# Patient Record
Sex: MALE | Race: OTHER | Employment: OTHER | URBAN - METROPOLITAN AREA
[De-identification: names, ages, dates, MRNs, and addresses within clinical notes are randomized per-mention and may not be internally consistent; named-entity substitution may affect disease eponyms.]

---

## 2019-08-27 ENCOUNTER — APPOINTMENT (OUTPATIENT)
Dept: GENERAL RADIOLOGY | Age: 79
DRG: 439 | End: 2019-08-27
Attending: EMERGENCY MEDICINE
Payer: MEDICARE

## 2019-08-27 ENCOUNTER — HOSPITAL ENCOUNTER (INPATIENT)
Age: 79
LOS: 2 days | Discharge: HOME | DRG: 439 | End: 2019-08-29
Attending: EMERGENCY MEDICINE | Admitting: HOSPITALIST
Payer: MEDICARE

## 2019-08-27 ENCOUNTER — APPOINTMENT (OUTPATIENT)
Dept: CT IMAGING | Age: 79
DRG: 439 | End: 2019-08-27
Attending: EMERGENCY MEDICINE
Payer: MEDICARE

## 2019-08-27 DIAGNOSIS — K85.90 ACUTE PANCREATITIS, UNSPECIFIED COMPLICATION STATUS, UNSPECIFIED PANCREATITIS TYPE: Primary | ICD-10-CM

## 2019-08-27 DIAGNOSIS — C64.1 RENAL CELL CANCER, RIGHT (HCC): ICD-10-CM

## 2019-08-27 DIAGNOSIS — I10 HYPERTENSION, UNSPECIFIED TYPE: ICD-10-CM

## 2019-08-27 LAB
ALBUMIN SERPL-MCNC: 4.5 G/DL (ref 3.2–4.6)
ALBUMIN/GLOB SERPL: 1.2 {RATIO} (ref 1.2–3.5)
ALP SERPL-CCNC: 100 U/L (ref 50–136)
ALT SERPL-CCNC: 300 U/L (ref 12–65)
ANION GAP SERPL CALC-SCNC: 7 MMOL/L (ref 7–16)
AST SERPL-CCNC: 501 U/L (ref 15–37)
BASOPHILS # BLD: 0 K/UL (ref 0–0.2)
BASOPHILS NFR BLD: 0 % (ref 0–2)
BILIRUB SERPL-MCNC: 2.4 MG/DL (ref 0.2–1.1)
BUN SERPL-MCNC: 23 MG/DL (ref 8–23)
CALCIUM SERPL-MCNC: 9.3 MG/DL (ref 8.3–10.4)
CHLORIDE SERPL-SCNC: 105 MMOL/L (ref 98–107)
CO2 SERPL-SCNC: 26 MMOL/L (ref 21–32)
CREAT SERPL-MCNC: 1.33 MG/DL (ref 0.8–1.5)
DIFFERENTIAL METHOD BLD: ABNORMAL
EOSINOPHIL # BLD: 0 K/UL (ref 0–0.8)
EOSINOPHIL NFR BLD: 0 % (ref 0.5–7.8)
ERYTHROCYTE [DISTWIDTH] IN BLOOD BY AUTOMATED COUNT: 13.6 % (ref 11.9–14.6)
GLOBULIN SER CALC-MCNC: 3.7 G/DL (ref 2.3–3.5)
GLUCOSE SERPL-MCNC: 150 MG/DL (ref 65–100)
HCT VFR BLD AUTO: 46.8 % (ref 41.1–50.3)
HGB BLD-MCNC: 15.4 G/DL (ref 13.6–17.2)
IMM GRANULOCYTES # BLD AUTO: 0 K/UL (ref 0–0.5)
IMM GRANULOCYTES NFR BLD AUTO: 0 % (ref 0–5)
LIPASE SERPL-CCNC: ABNORMAL U/L (ref 73–393)
LYMPHOCYTES # BLD: 0.7 K/UL (ref 0.5–4.6)
LYMPHOCYTES NFR BLD: 6 % (ref 13–44)
MCH RBC QN AUTO: 30.9 PG (ref 26.1–32.9)
MCHC RBC AUTO-ENTMCNC: 32.9 G/DL (ref 31.4–35)
MCV RBC AUTO: 93.8 FL (ref 79.6–97.8)
MONOCYTES # BLD: 0.5 K/UL (ref 0.1–1.3)
MONOCYTES NFR BLD: 4 % (ref 4–12)
NEUTS SEG # BLD: 10.7 K/UL (ref 1.7–8.2)
NEUTS SEG NFR BLD: 89 % (ref 43–78)
NRBC # BLD: 0 K/UL (ref 0–0.2)
PLATELET # BLD AUTO: 234 K/UL (ref 150–450)
PMV BLD AUTO: 10 FL (ref 9.4–12.3)
POTASSIUM SERPL-SCNC: 3.7 MMOL/L (ref 3.5–5.1)
PROT SERPL-MCNC: 8.2 G/DL (ref 6.3–8.2)
RBC # BLD AUTO: 4.99 M/UL (ref 4.23–5.6)
SODIUM SERPL-SCNC: 138 MMOL/L (ref 136–145)
WBC # BLD AUTO: 12 K/UL (ref 4.3–11.1)

## 2019-08-27 PROCEDURE — 74011250636 HC RX REV CODE- 250/636

## 2019-08-27 PROCEDURE — 96374 THER/PROPH/DIAG INJ IV PUSH: CPT | Performed by: EMERGENCY MEDICINE

## 2019-08-27 PROCEDURE — 80053 COMPREHEN METABOLIC PANEL: CPT

## 2019-08-27 PROCEDURE — 99284 EMERGENCY DEPT VISIT MOD MDM: CPT | Performed by: EMERGENCY MEDICINE

## 2019-08-27 PROCEDURE — 74011000258 HC RX REV CODE- 258: Performed by: EMERGENCY MEDICINE

## 2019-08-27 PROCEDURE — 65610000001 HC ROOM ICU GENERAL

## 2019-08-27 PROCEDURE — 83690 ASSAY OF LIPASE: CPT

## 2019-08-27 PROCEDURE — 74177 CT ABD & PELVIS W/CONTRAST: CPT

## 2019-08-27 PROCEDURE — 77030020263 HC SOL INJ SOD CL0.9% LFCR 1000ML

## 2019-08-27 PROCEDURE — 74011250636 HC RX REV CODE- 250/636: Performed by: EMERGENCY MEDICINE

## 2019-08-27 PROCEDURE — 74011250636 HC RX REV CODE- 250/636: Performed by: HOSPITALIST

## 2019-08-27 PROCEDURE — 74022 RADEX COMPL AQT ABD SERIES: CPT

## 2019-08-27 PROCEDURE — 74011636320 HC RX REV CODE- 636/320: Performed by: EMERGENCY MEDICINE

## 2019-08-27 PROCEDURE — 85025 COMPLETE CBC W/AUTO DIFF WBC: CPT

## 2019-08-27 PROCEDURE — 96375 TX/PRO/DX INJ NEW DRUG ADDON: CPT | Performed by: EMERGENCY MEDICINE

## 2019-08-27 RX ORDER — MORPHINE SULFATE 2 MG/ML
4 INJECTION, SOLUTION INTRAMUSCULAR; INTRAVENOUS
Status: COMPLETED | OUTPATIENT
Start: 2019-08-27 | End: 2019-08-27

## 2019-08-27 RX ORDER — MORPHINE SULFATE 2 MG/ML
INJECTION, SOLUTION INTRAMUSCULAR; INTRAVENOUS
Status: ACTIVE
Start: 2019-08-27 | End: 2019-08-27

## 2019-08-27 RX ORDER — IBUPROFEN 200 MG
1 TABLET ORAL
Status: DISCONTINUED | OUTPATIENT
Start: 2019-08-27 | End: 2019-08-29 | Stop reason: HOSPADM

## 2019-08-27 RX ORDER — SODIUM CHLORIDE 0.9 % (FLUSH) 0.9 %
5-40 SYRINGE (ML) INJECTION EVERY 8 HOURS
Status: DISCONTINUED | OUTPATIENT
Start: 2019-08-27 | End: 2019-08-29 | Stop reason: HOSPADM

## 2019-08-27 RX ORDER — DIPHENHYDRAMINE HCL 25 MG
25 CAPSULE ORAL
Status: DISCONTINUED | OUTPATIENT
Start: 2019-08-27 | End: 2019-08-29 | Stop reason: HOSPADM

## 2019-08-27 RX ORDER — ENOXAPARIN SODIUM 100 MG/ML
40 INJECTION SUBCUTANEOUS EVERY 24 HOURS
Status: DISCONTINUED | OUTPATIENT
Start: 2019-08-27 | End: 2019-08-28 | Stop reason: SDUPTHER

## 2019-08-27 RX ORDER — ONDANSETRON 2 MG/ML
INJECTION INTRAMUSCULAR; INTRAVENOUS
Status: ACTIVE
Start: 2019-08-27 | End: 2019-08-27

## 2019-08-27 RX ORDER — ONDANSETRON 2 MG/ML
4 INJECTION INTRAMUSCULAR; INTRAVENOUS
Status: DISCONTINUED | OUTPATIENT
Start: 2019-08-27 | End: 2019-08-29 | Stop reason: HOSPADM

## 2019-08-27 RX ORDER — SODIUM CHLORIDE 9 MG/ML
125 INJECTION, SOLUTION INTRAVENOUS CONTINUOUS
Status: DISCONTINUED | OUTPATIENT
Start: 2019-08-27 | End: 2019-08-29

## 2019-08-27 RX ORDER — CLONIDINE HYDROCHLORIDE 0.1 MG/1
0.2 TABLET ORAL
Status: DISCONTINUED | OUTPATIENT
Start: 2019-08-27 | End: 2019-08-29 | Stop reason: HOSPADM

## 2019-08-27 RX ORDER — SODIUM CHLORIDE 0.9 % (FLUSH) 0.9 %
5-40 SYRINGE (ML) INJECTION AS NEEDED
Status: DISCONTINUED | OUTPATIENT
Start: 2019-08-27 | End: 2019-08-29 | Stop reason: HOSPADM

## 2019-08-27 RX ORDER — MORPHINE SULFATE 2 MG/ML
2 INJECTION, SOLUTION INTRAMUSCULAR; INTRAVENOUS
Status: DISCONTINUED | OUTPATIENT
Start: 2019-08-27 | End: 2019-08-29 | Stop reason: HOSPADM

## 2019-08-27 RX ORDER — ACETAMINOPHEN 325 MG/1
650 TABLET ORAL
Status: DISCONTINUED | OUTPATIENT
Start: 2019-08-27 | End: 2019-08-29 | Stop reason: HOSPADM

## 2019-08-27 RX ORDER — SODIUM CHLORIDE 0.9 % (FLUSH) 0.9 %
10 SYRINGE (ML) INJECTION
Status: COMPLETED | OUTPATIENT
Start: 2019-08-27 | End: 2019-08-27

## 2019-08-27 RX ORDER — HYDROCODONE BITARTRATE AND ACETAMINOPHEN 7.5; 325 MG/1; MG/1
1 TABLET ORAL
Status: DISCONTINUED | OUTPATIENT
Start: 2019-08-27 | End: 2019-08-29 | Stop reason: HOSPADM

## 2019-08-27 RX ORDER — HYDRALAZINE HYDROCHLORIDE 20 MG/ML
10 INJECTION INTRAMUSCULAR; INTRAVENOUS
Status: DISCONTINUED | OUTPATIENT
Start: 2019-08-27 | End: 2019-08-29 | Stop reason: HOSPADM

## 2019-08-27 RX ORDER — NALOXONE HYDROCHLORIDE 0.4 MG/ML
0.4 INJECTION, SOLUTION INTRAMUSCULAR; INTRAVENOUS; SUBCUTANEOUS AS NEEDED
Status: DISCONTINUED | OUTPATIENT
Start: 2019-08-27 | End: 2019-08-29 | Stop reason: HOSPADM

## 2019-08-27 RX ORDER — ADHESIVE BANDAGE
30 BANDAGE TOPICAL DAILY PRN
Status: DISCONTINUED | OUTPATIENT
Start: 2019-08-27 | End: 2019-08-29 | Stop reason: HOSPADM

## 2019-08-27 RX ORDER — ONDANSETRON 2 MG/ML
4 INJECTION INTRAMUSCULAR; INTRAVENOUS
Status: COMPLETED | OUTPATIENT
Start: 2019-08-27 | End: 2019-08-27

## 2019-08-27 RX ADMIN — MORPHINE SULFATE 2 MG: 2 INJECTION, SOLUTION INTRAMUSCULAR; INTRAVENOUS at 06:40

## 2019-08-27 RX ADMIN — SODIUM CHLORIDE 125 ML/HR: 900 INJECTION, SOLUTION INTRAVENOUS at 21:42

## 2019-08-27 RX ADMIN — ONDANSETRON 4 MG: 2 INJECTION INTRAMUSCULAR; INTRAVENOUS at 06:40

## 2019-08-27 RX ADMIN — SODIUM CHLORIDE 125 ML/HR: 900 INJECTION, SOLUTION INTRAVENOUS at 14:27

## 2019-08-27 RX ADMIN — MORPHINE SULFATE 4 MG: 2 INJECTION, SOLUTION INTRAMUSCULAR; INTRAVENOUS at 02:39

## 2019-08-27 RX ADMIN — SODIUM CHLORIDE 1000 ML: 900 INJECTION, SOLUTION INTRAVENOUS at 03:03

## 2019-08-27 RX ADMIN — SODIUM CHLORIDE 125 ML/HR: 900 INJECTION, SOLUTION INTRAVENOUS at 06:23

## 2019-08-27 RX ADMIN — Medication 10 ML: at 21:42

## 2019-08-27 RX ADMIN — Medication 10 ML: at 03:52

## 2019-08-27 RX ADMIN — SODIUM CHLORIDE 100 ML: 900 INJECTION, SOLUTION INTRAVENOUS at 03:52

## 2019-08-27 RX ADMIN — ONDANSETRON 4 MG: 2 INJECTION INTRAMUSCULAR; INTRAVENOUS at 02:39

## 2019-08-27 RX ADMIN — DIATRIZOATE MEGLUMINE AND DIATRIZOATE SODIUM 15 ML: 660; 100 LIQUID ORAL; RECTAL at 03:03

## 2019-08-27 RX ADMIN — IOPAMIDOL 100 ML: 755 INJECTION, SOLUTION INTRAVENOUS at 03:52

## 2019-08-27 RX ADMIN — ENOXAPARIN SODIUM 40 MG: 40 INJECTION SUBCUTANEOUS at 17:02

## 2019-08-27 NOTE — H&P
INTERNAL MEDICINE H&P/CONSULT    Subjective:     Patient is a 70years old male with history of renal mass 4 years ago (for which he stopped follow up on it w/ a Urologist), GB distension w/ rupture and open cholecystectomy in 2001, presents with severe upper abdominal pain yesterday. no nausea or vomiting or change of BM. He denies weight changes. He do not drink alcohol and has no prior hx of pancreatitis. He takes no medications. Past Medical History:   Diagnosis Date    Kidney stone       Past Surgical History:   Procedure Laterality Date    HX HEENT      nose    LAP,CHOLECYSTECTOMY        Prior to Admission medications    Not on File     Allergies   Allergen Reactions    Penicillins Unknown (comments)      Social History     Tobacco Use    Smoking status: Former Smoker   Substance Use Topics    Alcohol use: No        Family History:  HTN    Review of Systems   A comprehensive review of systems was negative except for that written in the HPI. Objective: Intake / Output:  No intake/output data recorded. No intake/output data recorded. Physical Exam:  Visit Vitals  BP (!) 184/96 (BP 1 Location: Left arm)   Pulse 68   Temp 98.2 °F (36.8 °C)   Resp 20   Ht 6' (1.829 m)   Wt 86.2 kg (190 lb)   SpO2 98%   BMI 25.77 kg/m²     General appearance: awake, alert, cooperative, moderate distress, appears stated age   Head: Normocephalic, without obvious abnormality, atraumatic  Back: symmetric, no curvature. ROM normal. No CVA tenderness. Lungs: clear to auscultation bilaterally   Heart: regular rate and rhythm, S1, S2 normal, no murmur, click, rub or gallop. Abdomen: soft, epigastric tenderness, no distension, normal bowel sound, no masses, no organomegaly  Extremities: atraumatic, no cyanosis - Bilateral lower limbs edema none. Skin: No rashes or ulceration.   Neurologic: Grossly intact     ECG: sinus rhythm     Data Review (Labs):   Recent Results (from the past 24 hour(s))   CBC WITH AUTOMATED DIFF    Collection Time: 08/27/19  2:16 AM   Result Value Ref Range    WBC 12.0 (H) 4.3 - 11.1 K/uL    RBC 4.99 4.23 - 5.6 M/uL    HGB 15.4 13.6 - 17.2 g/dL    HCT 46.8 41.1 - 50.3 %    MCV 93.8 79.6 - 97.8 FL    MCH 30.9 26.1 - 32.9 PG    MCHC 32.9 31.4 - 35.0 g/dL    RDW 13.6 11.9 - 14.6 %    PLATELET 180 711 - 371 K/uL    MPV 10.0 9.4 - 12.3 FL    ABSOLUTE NRBC 0.00 0.0 - 0.2 K/uL    DF AUTOMATED      NEUTROPHILS 89 (H) 43 - 78 %    LYMPHOCYTES 6 (L) 13 - 44 %    MONOCYTES 4 4.0 - 12.0 %    EOSINOPHILS 0 (L) 0.5 - 7.8 %    BASOPHILS 0 0.0 - 2.0 %    IMMATURE GRANULOCYTES 0 0.0 - 5.0 %    ABS. NEUTROPHILS 10.7 (H) 1.7 - 8.2 K/UL    ABS. LYMPHOCYTES 0.7 0.5 - 4.6 K/UL    ABS. MONOCYTES 0.5 0.1 - 1.3 K/UL    ABS. EOSINOPHILS 0.0 0.0 - 0.8 K/UL    ABS. BASOPHILS 0.0 0.0 - 0.2 K/UL    ABS. IMM. GRANS. 0.0 0.0 - 0.5 K/UL   METABOLIC PANEL, COMPREHENSIVE    Collection Time: 08/27/19  2:16 AM   Result Value Ref Range    Sodium 138 136 - 145 mmol/L    Potassium 3.7 3.5 - 5.1 mmol/L    Chloride 105 98 - 107 mmol/L    CO2 26 21 - 32 mmol/L    Anion gap 7 7 - 16 mmol/L    Glucose 150 (H) 65 - 100 mg/dL    BUN 23 8 - 23 MG/DL    Creatinine 1.33 0.8 - 1.5 MG/DL    GFR est AA >60 >60 ml/min/1.73m2    GFR est non-AA 56 (L) >60 ml/min/1.73m2    Calcium 9.3 8.3 - 10.4 MG/DL    Bilirubin, total 2.4 (H) 0.2 - 1.1 MG/DL    ALT (SGPT) 300 (H) 12 - 65 U/L    AST (SGOT) 501 (H) 15 - 37 U/L    Alk.  phosphatase 100 50 - 136 U/L    Protein, total 8.2 6.3 - 8.2 g/dL    Albumin 4.5 3.2 - 4.6 g/dL    Globulin 3.7 (H) 2.3 - 3.5 g/dL    A-G Ratio 1.2 1.2 - 3.5     LIPASE    Collection Time: 08/27/19  2:16 AM   Result Value Ref Range    Lipase >30,000 (H) 73 - 393 U/L       Assessment:     Principal Problem:    Acute pancreatitis (8/27/2019) likely biliary    Active Problems:    Renal cell cancer, right ([address] (8/27/2019)    Plan:     NPO  IVF hydration  Pain control  Blood pressure control  AM lab  Gi and Urology consulted  Patient is full code. Further management depends on patient progress. Thank you for the oppourtinity to contribute in the care of your patient. Time 30 minutes.     Signed By: Hilary Barrera MD     August 27, 2019

## 2019-08-27 NOTE — PROGRESS NOTES
Late entry due to hands on patient care. Patient alert and oriented. Follow commands. Spot oxygen saturation 98 % on room air. Respiration even and unlabored. Head of bed elevated. Instructed patient on use of call light. Verbalize an understanding. Bed in low/lock position.

## 2019-08-27 NOTE — PROGRESS NOTES
Care Management Interventions  PCP Verified by CM: No(Gave PCP list)  Mode of Transport at Discharge: Other (see comment)  Transition of Care Consult (CM Consult): Other  Confirm Follow Up Transport: Family  Plan discussed with Pt/Family/Caregiver: Yes  Freedom of Choice Offered: Yes  Discharge Location  Discharge Placement: Home  Saw pt in interdisciplinarily rounds with the following disciplines: Physician, Case Management, Nursing, Dietary,Therapy and Pharmacy. The plan of care was discussed along with discharge date/ location. Visited with pt regarding plans for discharge, pt lives at home, alone, inde with ADL's, does not have a PCP, so I provided him with a list. He did not want me to schedule a appointment for him.

## 2019-08-27 NOTE — PROGRESS NOTES
TRANSFER - IN REPORT:    Verbal report received from [facility] on Emily Wei  being received from ED for routine progression of care      Report consisted of patients Situation, Background, Assessment and   Recommendations(SBAR). Information from the following report(s) SBAR, Kardex, ED Summary, [facility] and Recent Results was reviewed with the receiving nurse. Opportunity for questions and clarification was provided. Assessment completed upon patients arrival to unit and care assumed.

## 2019-08-27 NOTE — ED NOTES
TRANSFER - OUT REPORT:    Verbal report given to José Parikh on Ruddy Hines  being transferred to [facility] routine progression of care       Report consisted of patients Situation, Background, Assessment and   Recommendations(SBAR). Information from the following report(s) ED Summary was reviewed with the receiving nurse. Lines:   Peripheral IV 08/27/19 Right; Outer Antecubital (Active)        Opportunity for questions and clarification was provided.       Patient transported with:   Registered Nurse

## 2019-08-27 NOTE — ED PROVIDER NOTES
Patient is a 57-year-old male who present with severe abdominal pain in the epigastric region. Patient said it just started this evening and he has never experienced similar symptoms to this before. Notes his last bowel movement was today and he urinated about an hour prior to coming to the emergency department. The patient denies any previous history of small bowel obstruction. The patient takes no medications and states that he works out daily. The patient says that he was told he had a mass on his kidney 4 years ago and followed up with urology but then due to a lack of follow-up he got frustrated with them and did not complete the work-up.            Past Medical History:   Diagnosis Date    Kidney stone        Past Surgical History:   Procedure Laterality Date    HX HEENT      nose    LAP,CHOLECYSTECTOMY           Family History:   Problem Relation Age of Onset    Cancer Mother        Social History     Socioeconomic History    Marital status: SINGLE     Spouse name: Not on file    Number of children: Not on file    Years of education: Not on file    Highest education level: Not on file   Occupational History    Not on file   Social Needs    Financial resource strain: Not on file    Food insecurity:     Worry: Not on file     Inability: Not on file    Transportation needs:     Medical: Not on file     Non-medical: Not on file   Tobacco Use    Smoking status: Former Smoker   Substance and Sexual Activity    Alcohol use: No    Drug use: Not on file    Sexual activity: Not on file   Lifestyle    Physical activity:     Days per week: Not on file     Minutes per session: Not on file    Stress: Not on file   Relationships    Social connections:     Talks on phone: Not on file     Gets together: Not on file     Attends [employer] witness service: Not on file     Active member of club or organization: Not on file     Attends meetings of clubs or organizations: Not on file     Relationship status: Not on file    Intimate partner violence:     Fear of current or ex partner: Not on file     Emotionally abused: Not on file     Physically abused: Not on file     Forced sexual activity: Not on file   Other Topics Concern    Not on file   Social History Narrative    Not on file         ALLERGIES: Penicillins    Review of Systems   All other systems reviewed and are negative. Vitals:    08/27/19 0207 08/27/19 0213 08/27/19 0214   BP: (!) 178/94 (!) 177/94    Pulse: 68     Resp: 18     Temp: 97.5 °F (36.4 °C)     SpO2: 100%  100%   Weight: 86.2 kg (190 lb)     Height: 6' (1.829 m)              Physical Exam     GENERAL:The patient is overweight, and well-hydrated. Acute distress secondary to severe pain  VITAL SIGNS: Heart rate, blood pressure, respiratory rate reviewed as recorded in  nurse's notes  EYES: Pupils reactive. Extraocular motion intact. No conjunctival redness or drainage. NECK: Supple, no meningeal signs. Trachea midline. No masses or thyromegaly. LUNGS: Breath sounds clear and equal bilaterally no accessory muscle use  CHEST: No deformity  CARDIOVASCULAR: Regular rate and rhythm  ABDOMEN: Positive bowel sounds in all 4 quadrants. Patient has severe tenderness palpation in the epigastric region. There is no rigidity or guarding present. Palpable mass appreciated. EXTREMITIES: No clubbing or cyanosis. No joint swelling. Normal muscle tone. No  restricted range of motion appreciated. NEUROLOGIC: Sensation is grossly intact. Cranial nerve exam reveals face is  symmetrical, tongue is midline speech is clear. SKIN: No rash or petechiae. Good skin turgor palpated. PSYCHIATRIC: Alert and oriented. Appropriate behavior and judgment.       MDM  Number of Diagnoses or Management Options  Acute pancreatitis, unspecified complication status, unspecified pancreatitis type:   Hypertension, unspecified type:   Renal cell cancer, [facility]):   Diagnosis management comments: Viral infection, gastroenteritis, viral adenitis, pseudomembranous colitis, inflammatory  bowel disease, infectious diarrhea    Abdominal wall pain,     Constipation, fecal impaction, small bowel obstruction, partial small bowel obstruction,  Ileus    UTI, pyelonephritis, renal colic, ureteral stone     Peptic ulcer disease, esophagitis, GERD    Pancreatitis, pancreatic pseudocyst,    hepatic cirrhosis, GI bleed, esophageal varices, poisoning,    gallbladder disease, cholecystitis, diverticulitis, appendicitis, appendicitis with rupture,    ingestion of foreign material         Amount and/or Complexity of Data Reviewed  Clinical lab tests: reviewed and ordered  Tests in the radiology section of CPT®: reviewed and ordered  Tests in the medicine section of CPT®: ordered and reviewed  Review and summarize past medical records: yes  Discuss the patient with other providers: yes  Independent visualization of images, tracings, or specimens: yes      ED Course as of Aug 27 0437   Tue Aug 27, 2019   0334 The patient is feeling much better after the pain medicine given to him in the emergency department I talked to him about the findings on the blood work and my concern over the previous findings in his medical records. Patient states that he try to follow-up with urology but then they lost track of him and he got upset with them so he just did not go back. He said that they wanted to do a surgery for the mass but he was to frustrated with them and never had anything done about it. [KH]   0422 IMPRESSION:    Increased size of right renal mass consistent with renal cell carcinoma. Urology  consultation is recommended. Small left inguinal hernia. Cholecystectomy. Enlarged prostate gland. Coronary artery disease with aortic valvular calcification. Linear atelectasis of the right lower lobe.    CT ABD PELV W CONT [KH]   0430 Case discussed with the hospitalist on-call who will come and see the patient and plan on admission to the hospital. []   0430 I talked to the patient about the findings in the emergency department need for admission. He is agreeable with this plan.     [KH]      ED Course User Index  [KH] Prisca Plunkett,        Procedures

## 2019-08-27 NOTE — ED NOTES
Explained to pt that no upstairs room available at this time. Pt verbalized understanding. Pt calling cousin to come get truck. Pt states no needs at this time. Call bell provided.

## 2019-08-27 NOTE — PROGRESS NOTES
Problem: General Medical Care Plan  Goal: *Vital signs within specified parameters  Outcome: Progressing Towards Goal  Goal: *Optimal pain control at patient's stated goal  Outcome: Progressing Towards Goal  Goal: *Skin integrity maintained  Outcome: Progressing Towards Goal     Problem: Falls - Risk of  Goal: *Absence of Falls  Description  Document Frantz Moseley Fall Risk and appropriate interventions in the flowsheet.   Outcome: Progressing Towards Goal  Note:   Fall Risk Interventions:                                Problem: Patient Education: Go to Patient Education Activity  Goal: Patient/Family Education  Outcome: Progressing Towards Goal

## 2019-08-27 NOTE — CONSULTS
[facility] Consult Note       Primary GI Physician: Dr. Sherrie Gonzales    Referring Provider:  Dr. Sophia Kaur Date:  8/27/2019    Admit Date:  8/27/2019    Chief Complaint:  pancreatitis    Subjective:     History of Present Illness:  Patient is a 70 y.o. male with PMH of nephrolithiasis, hx of renal mass 4y ago (previously evaluated by urology), and hx of  cholecystectomy after GB rupture 2001, who is seen in consultation at the request of Dr. Franklin Hanna for acute pancreatitis. Patient presented to the ER with severe abdominal pain without N/V, changes in bowel habits, or weight loss. He denies alcohol use and has no prior hx of pancreatitis. He takes no home medications. Lipase on presentation >30k with elevated LFTs ( Tbili 2.4, , , Alk phos 100). WBC elevated at 12 with elevated Neutrophils. Creatinine was initially elevated at 1.35 and is not within normal range at 1.33. CT, interestingly found a renal mass consistent with RCC but no evidence of acute pancreatitis changes. There was no biliary ductal dilation noted. He had sudden onset pain; now under control with pain meds. No new meds, abd trauma, prior episodic pain or ETOH. Has known about the renal cell mass but refused surgery. No prior colo. PMH:  Past Medical History:   Diagnosis Date    Kidney stone        PSH:  Past Surgical History:   Procedure Laterality Date    HX HEENT      nose    LAP,CHOLECYSTECTOMY         Allergies:   Allergies   Allergen Reactions    Penicillins Unknown (comments)       Home Medications:  Prior to Admission medications    Not on [facility] Medications:  Current Facility-Administered Medications   Medication Dose Route Frequency    ondansetron (ZOFRAN) 4 mg/2 mL injection        morphine 2 mg/mL injection        0.9% sodium chloride infusion  125 mL/hr IntraVENous CONTINUOUS    sodium chloride (NS) flush 5-40 mL  5-40 mL IntraVENous Q8H    sodium chloride (NS) flush 5-40 mL  5-40 mL IntraVENous PRN    acetaminophen (TYLENOL) tablet 650 mg  650 mg Oral Q4H PRN    HYDROcodone-acetaminophen (NORCO) 7.5-325 mg per tablet 1 Tab  1 Tab Oral Q4H PRN    morphine injection 2 mg  2 mg IntraVENous Q4H PRN    naloxone (NARCAN) injection 0.4 mg  0.4 mg IntraVENous PRN    diphenhydrAMINE (BENADRYL) capsule 25 mg  25 mg Oral Q4H PRN    ondansetron (ZOFRAN) injection 4 mg  4 mg IntraVENous Q4H PRN    magnesium hydroxide (MILK OF MAGNESIA) 400 mg/5 mL oral suspension 30 mL  30 mL Oral DAILY PRN    nicotine (NICODERM CQ) 21 mg/24 hr patch 1 Patch  1 Patch TransDERmal DAILY PRN    enoxaparin (LOVENOX) injection 40 mg  40 mg SubCUTAneous Q24H    cloNIDine HCl (CATAPRES) tablet 0.2 mg  0.2 mg Oral BID PRN    hydrALAZINE (APRESOLINE) 20 mg/mL injection 10 mg  10 mg IntraVENous Q6H PRN       Social History:  Social History     Tobacco Use    Smoking status: Former Smoker   Substance Use Topics    Alcohol use: No       Pt denies any history of drug use, blood transfusions, or tattoos. Family History:  Family History   Problem Relation Age of Onset    Cancer Mother        Review of Systems:  A detailed 10 system ROS is obtained, with pertinent positives as listed above. All others are negative. Diet:  NPO    Objective:     Physical Exam:  Vitals:  Visit Vitals  /82   Pulse 72   Temp 98.1 °F (36.7 °C)   Resp 16   Ht 6' (1.829 m)   Wt 86.2 kg (190 lb)   SpO2 98%   BMI 25.77 kg/m²     Gen:  Pt is alert, cooperative, no acute distress  Skin:  Extremities and face reveal no rashes. HEENT: Sclerae anicteric. Extra-occular muscles are intact. No oral ulcers. No abnormal pigmentation of the lips. The neck is supple. Cardiovascular: Regular rate and rhythm. No murmurs, gallops, or rubs. Respiratory:  Comfortable breathing with no accessory muscle use. Clear breath sounds anteriorly with no wheezes, rales, or rhonchi. GI:  Abdomen nondistended, soft, and nontender.   Normal active bowel sounds. No enlargement of the liver or spleen. No masses palpable. Rectal:  Deferred  Musculoskeletal:  No pitting edema of the lower legs. Neurological:  Gross memory appears intact. Patient is alert and oriented. Psychiatric:  Mood appears appropriate with judgement intact. Lymphatic:  No cervical or supraclavicular adenopathy. Laboratory:    Recent Labs     08/27/19  0216   WBC 12.0*   HGB 15.4   HCT 46.8      MCV 93.8      K 3.7      CO2 26   BUN 23   CREA 1.33   CA 9.3   *      SGOT 501*   *   TBILI 2.4*   ALB 4.5   TP 8.2   LPSE >30,000*      CT A/P with contrast 8/27/19: FINDINGS:  *  LUNG BASES: Coronary artery disease. Aortic valvular calcification. Linear atelectasis of the right lower lobe. *  LIVER: Within normal limits. *  GALLBLADDER AND BILE DUCTS: Cholecystectomy. *  SPLEEN: Within normal limits. *  URINARY TRACT: Solid right upper pole renal mass measuring 7.0 x 6.7 cm increased in size from 5.3 x 4.2 cm. The right renal vein is patent. *  ADRENALS: Within normal limits. *  PANCREAS: Within normal limits. *  GASTROINTESTINAL TRACT: Within normal limits. *  RETROPERITONEUM: Within normal limits. *  PERITONEAL CAVITY AND ABDOMINAL WALL: Small left inguinal hernia. *  PELVIS: Enlarged prostate gland. *  SPINE / BONES: Within normal limits. *  OTHER COMMENTS: None. IMPRESSION:  Increased size of right renal mass consistent with renal cell carcinoma. Urology consultation is recommended.   Small left inguinal hernia.   Cholecystectomy.   Enlarged prostate gland.   Coronary artery disease with aortic valvular calcification.   Linear atelectasis of the right lower lobe.        Assessment:     Principal Problem:    Acute pancreatitis (8/27/2019)    Active Problems:    Renal cell cancer, right ([facility] [address] (8/27/2019)    69 yo male with hx of hx of GB rupture with cholecystectomy 2001, nephrolithiasis, and probable RCC admitted for abdominal pain and found to have lipase >30,000 with elevation of LFTs. Leukocytosis is likely related to acute inflammation secondary to pancreatitis. CT A/P shows no evidence of biliary ductal dilation or pancreatitis. He denies ETOH use and is not taking any home medications. Plan:     No clear etiology for acute pancreatitis. Watch LFT's and LIpase. If resolving; plan repeat evaluation with outpt imaging.   Emily Childs MD

## 2019-08-28 LAB
ALBUMIN SERPL-MCNC: 3.3 G/DL (ref 3.2–4.6)
ALBUMIN/GLOB SERPL: 1.1 {RATIO} (ref 1.2–3.5)
ALP SERPL-CCNC: 86 U/L (ref 50–136)
ALT SERPL-CCNC: 145 U/L (ref 12–65)
ANION GAP SERPL CALC-SCNC: 7 MMOL/L (ref 7–16)
AST SERPL-CCNC: 78 U/L (ref 15–37)
BILIRUB SERPL-MCNC: 2 MG/DL (ref 0.2–1.1)
BUN SERPL-MCNC: 14 MG/DL (ref 8–23)
CALCIUM SERPL-MCNC: 8.3 MG/DL (ref 8.3–10.4)
CHLORIDE SERPL-SCNC: 107 MMOL/L (ref 98–107)
CO2 SERPL-SCNC: 28 MMOL/L (ref 21–32)
CREAT SERPL-MCNC: 1.09 MG/DL (ref 0.8–1.5)
ERYTHROCYTE [DISTWIDTH] IN BLOOD BY AUTOMATED COUNT: 13.6 % (ref 11.9–14.6)
GLOBULIN SER CALC-MCNC: 3 G/DL (ref 2.3–3.5)
GLUCOSE BLD STRIP.AUTO-MCNC: 91 MG/DL (ref 65–100)
GLUCOSE SERPL-MCNC: 85 MG/DL (ref 65–100)
HCT VFR BLD AUTO: 39.7 % (ref 41.1–50.3)
HGB BLD-MCNC: 12.9 G/DL (ref 13.6–17.2)
LIPASE SERPL-CCNC: 1336 U/L (ref 73–393)
MCH RBC QN AUTO: 30.7 PG (ref 26.1–32.9)
MCHC RBC AUTO-ENTMCNC: 32.5 G/DL (ref 31.4–35)
MCV RBC AUTO: 94.5 FL (ref 79.6–97.8)
NRBC # BLD: 0 K/UL (ref 0–0.2)
PLATELET # BLD AUTO: 186 K/UL (ref 150–450)
PMV BLD AUTO: 10.1 FL (ref 9.4–12.3)
POTASSIUM SERPL-SCNC: 3.2 MMOL/L (ref 3.5–5.1)
PROT SERPL-MCNC: 6.3 G/DL (ref 6.3–8.2)
RBC # BLD AUTO: 4.2 M/UL (ref 4.23–5.6)
SODIUM SERPL-SCNC: 142 MMOL/L (ref 136–145)
WBC # BLD AUTO: 9.5 K/UL (ref 4.3–11.1)

## 2019-08-28 PROCEDURE — 82962 GLUCOSE BLOOD TEST: CPT

## 2019-08-28 PROCEDURE — 81003 URINALYSIS AUTO W/O SCOPE: CPT

## 2019-08-28 PROCEDURE — 80053 COMPREHEN METABOLIC PANEL: CPT

## 2019-08-28 PROCEDURE — 87086 URINE CULTURE/COLONY COUNT: CPT

## 2019-08-28 PROCEDURE — 81001 URINALYSIS AUTO W/SCOPE: CPT

## 2019-08-28 PROCEDURE — 77030020263 HC SOL INJ SOD CL0.9% LFCR 1000ML

## 2019-08-28 PROCEDURE — 74011250636 HC RX REV CODE- 250/636: Performed by: HOSPITALIST

## 2019-08-28 PROCEDURE — 65270000029 HC RM PRIVATE

## 2019-08-28 PROCEDURE — 36415 COLL VENOUS BLD VENIPUNCTURE: CPT

## 2019-08-28 PROCEDURE — 85027 COMPLETE CBC AUTOMATED: CPT

## 2019-08-28 PROCEDURE — 83690 ASSAY OF LIPASE: CPT

## 2019-08-28 RX ORDER — ENOXAPARIN SODIUM 100 MG/ML
40 INJECTION SUBCUTANEOUS EVERY 24 HOURS
Status: DISCONTINUED | OUTPATIENT
Start: 2019-08-28 | End: 2019-08-29 | Stop reason: HOSPADM

## 2019-08-28 RX ADMIN — ENOXAPARIN SODIUM 40 MG: 40 INJECTION SUBCUTANEOUS at 17:29

## 2019-08-28 RX ADMIN — SODIUM CHLORIDE 125 ML/HR: 900 INJECTION, SOLUTION INTRAVENOUS at 05:01

## 2019-08-28 RX ADMIN — Medication 10 ML: at 05:02

## 2019-08-28 RX ADMIN — SODIUM CHLORIDE 125 ML/HR: 900 INJECTION, SOLUTION INTRAVENOUS at 12:19

## 2019-08-28 RX ADMIN — Medication 10 ML: at 22:00

## 2019-08-28 RX ADMIN — SODIUM CHLORIDE 125 ML/HR: 900 INJECTION, SOLUTION INTRAVENOUS at 19:07

## 2019-08-28 NOTE — PROGRESS NOTES
Hospitalist Progress Note    2019  Admit Date: 2019  2:04 AM   NAME: Rosa Villatoro   :  1940   MRN:  [id number]   Attending: Amisha Saucedo MD  PCP:  Mackenzie Cespedes MD    SUBJECTIVE:   Patient is a 78yo M with hx renal mass, GB rupture/open ivette who presents with acute pancreatitis. : abdominal pain resolved. No nausea. CLD pending for lunch. Desires to continue advancing if tolerated. Seen by urology regarding suspected RCC. Pt declines surgery. Review of Systems negative with exception of pertinent positives noted above  PHYSICAL EXAM     Visit Vitals  /86   Pulse 76   Temp 98.9 °F (37.2 °C)   Resp 18   Ht 6' (1.829 m)   Wt 81.6 kg (180 lb)   SpO2 98%   BMI 24.41 kg/m²      Temp (24hrs), Av.7 °F (37.1 °C), Min:98.3 °F (36.8 °C), Max:99 °F (37.2 °C)    Oxygen Therapy  O2 Sat (%): 98 % (19 1844)  Pulse via Oximetry: 75 beats per minute (19 0504)  O2 Device: Room air (19 0406)    Intake/Output Summary (Last 24 hours) at 2019 1241  Last data filed at 2019 0503  Gross per 24 hour   Intake 2777.08 ml   Output    Net 2777.08 ml      General: No acute distress    Lungs:  CTA Bilaterally. Heart:  Regular rate and rhythm,  No murmur, rub, or gallop  Abdomen: Soft, Non distended, Non tender, Positive bowel sounds  Extremities: No cyanosis, clubbing or edema  Neurologic:  No focal deficits    CT ABD PELV W CONT   Final Result   IMPRESSION:      Increased size of right renal mass consistent with renal cell carcinoma. Urology   consultation is recommended. Small left inguinal hernia. Cholecystectomy. Enlarged prostate gland. Coronary artery disease with aortic valvular calcification. Linear atelectasis of the right lower lobe. Date of Dictation: 2019 4:13 AM            XR ABD ACUTE W 1 V CHEST   Final Result   IMPRESSION:  No acute findings in the chest or abdomen.                ASSESSMENT      Active Hospital Problems Diagnosis Date Noted    Acute pancreatitis 08/27/2019    Renal cell cancer, right ([address] 08/27/2019     Plan:    Pancreatitis: improving symptoms, lfts and lipase improving. Advance diet. Possible DC tomorrow. Renal mass: suspected RCC. Pt refuses surgery. Urology encourages patient to follow-up if he changes mind.     DVT Prophylaxis: lovenox  Dispo: home likely tomorrow    Signed By: Julio Burr MD     August 28, 2019

## 2019-08-28 NOTE — PROGRESS NOTES
No change in assessment. Patient resting quietly. Call light within reach. Bed in low/lock position.

## 2019-08-28 NOTE — CONSULTS
[address] Urology Consult Note                                           08/27/19     Patient: Brynn Vicente  MRN: [id number]    Admission Date:  8/27/2019, 0  Admission Diagnosis: Acute pancreatitis [K85.90]  Reason for Consult: Renal Cell Carcinoma    ASSESSMENT: 70 y.o.  male with an enlarging, enhancing 7 cm R renal mass very concerning for RCC. Urology is consulted for evaluation while admitted to the hospital for acute pancreatitis. PLAN:  -I discussed with the patient that his R kidney mass is most likely kidney cancer. I strongly recommended treatment in the form of R radical nephrectomy. We discussed lap vs. Open approaches. We also discussed that at this time his cancer is localized to the kidney and potentially curable with surgery, while likely deadly without intervention. He adamantly refused any intervention or urology follow up at this time. I spent a long time explaining to him that he could die of this disease within 5 years if it goes untreated. I also discussed that surgery may potentially cure him of the disease. However, he refuses any further intervention or urology follow up at this time and tells me that \"if it's my time to go, then it's my time to go. \"  At the end of our conversation, I provided him with my contact information in case he changes his mind and would like to consider treatment. -My office phone is [phone]. He can feel free to call any time.   -Will sign off. Call with questions. __________________________________________________________________________________    HPI:     Brynn Vicente is a 70 y.o.  male with a history of R renal mass concerning for RCC who previously saw my partner Dr. Bobby Aggarwal 4 years ago. Radical nephrectomy to treat his kidney cancer was strongly recommended, but he refused therapy and has been lost to follow up since that time.      He is now admitted to the hospital for acute pancreatitis/abdominal pain and repeat CT scan shows increased size of the mass from 5 cm to 7 cm without evidence of metastatic disease or vein involvement. Urology is now consulted to discuss management options with the patient again. Most recent Cr 1.33. CXR this admission shows no pulmonary mets. Denies hematuria, urgency, frequency, retention, incontinence or other concerns. Of note, he has a history of  rupture and open cholecystectomy in 2001. He reports problems with abdominal pain since that time. Past Medical History:  Past Medical History:   Diagnosis Date    Kidney stone        Past Surgical History:  Past Surgical History:   Procedure Laterality Date    HX HEENT      nose    LAP,CHOLECYSTECTOMY         Medications:  No current facility-administered medications on file prior to encounter. No current outpatient medications on file prior to encounter. Allergies:   Allergies   Allergen Reactions    Penicillins Unknown (comments)       Social History:  Social History     Socioeconomic History    Marital status: SINGLE     Spouse name: Not on file    Number of children: Not on file    Years of education: Not on file    Highest education level: Not on file   Occupational History    Not on file   Social Needs    Financial resource strain: Not on file    Food insecurity:     Worry: Not on file     Inability: Not on file    Transportation needs:     Medical: Not on file     Non-medical: Not on file   Tobacco Use    Smoking status: Former Smoker   Substance and Sexual Activity    Alcohol use: No    Drug use: Not on file    Sexual activity: Not on file   Lifestyle    Physical activity:     Days per week: Not on file     Minutes per session: Not on file    Stress: Not on file   Relationships    Social connections:     Talks on phone: Not on file     Gets together: Not on file     Attends Alevism service: Not on file     Active member of club or organization: Not on file     Attends meetings of clubs or organizations: Not on file     Relationship status: Not on file    Intimate partner violence:     Fear of current or ex partner: Not on file     Emotionally abused: Not on file     Physically abused: Not on file     Forced sexual activity: Not on file   Other Topics Concern    Not on file   Social History Narrative    Not on file       Family History:  Family History   Problem Relation Age of Onset    Cancer Mother        ROS:  Review of Systems - General ROS: negative for - chills, fatigue or fever  Respiratory ROS: no cough, shortness of breath, or wheezing  Cardiovascular ROS: no chest pain or dyspnea on exertion  Gastrointestinal ROS: no abdominal pain, change in bowel habits, or black or bloody stools  Musculoskeletal ROS: negative  negative for - muscular weakness    Vitals:  Visit Vitals  /80 (BP 1 Location: Left arm, BP Patient Position: At rest)   Pulse 64   Temp 99 °F (37.2 °C)   Resp 18   Ht 6' (1.829 m)   Wt 190 lb (86.2 kg)   SpO2 98%   BMI 25.77 kg/m²       Intake/Output:  No intake or output data in the 24 hours ending 08/27/19 2143     Physical Exam:   Visit Vitals  /80 (BP 1 Location: Left arm, BP Patient Position: At rest)   Pulse 64   Temp 99 °F (37.2 °C)   Resp 18   Ht 6' (1.829 m)   Wt 190 lb (86.2 kg)   SpO2 98%   BMI 25.77 kg/m²        GENERAL: No acute distress, Awake, Alert, Oriented X 3  HEENT: Trachea midline, No gross visual or auditory impairments  CARDIAC: regular rate and rhythm  CHEST AND LUNG: Easy work of breathing, clear to auscultation bilaterally  ABDOMEN: soft, non tender, non-distended, positive bowel sounds, no organomegaly, no palpable masses, no guarding, no rebound tenderness   SKIN: No rash, no erythema, no lacerations or abrasions, no ecchymosis  NEUROLOGIC: cranial nerves 2-12 grossly intact     Lab/Radiology/Other Diagnostic Tests:  Recent Labs     08/27/19  0216   HGB 15.4   HCT 46.8   WBC 12.0*      K 3.7    CO2 26   BUN 23   *   CA 9.3   *     CT A/P:     IMPRESSION:     Increased size of right renal mass consistent with renal cell carcinoma. Urology  consultation is recommended.     Small left inguinal hernia.     Cholecystectomy.     Enlarged prostate gland.     Coronary artery disease with aortic valvular calcification.     Linear atelectasis of the right lower lobe. Bassem Bean M.D.     [facility]  [facility] [address]  [address] [place] [address]  Phone: [phone]  Fax: [phone]

## 2019-08-28 NOTE — PROGRESS NOTES
Saw pt in interdisciplinarily rounds with the following disciplines: Physician, Case Management, Nursing, Dietary,Therapy and Pharmacy. The plan of care was discussed along with discharge date/ location. Pt may d/c home in 1-2 days, has refused any surgical recommendations.

## 2019-08-28 NOTE — PROGRESS NOTES
His numbers are improving and could introduce clears today to see if they are tolerated. Follow serial LFT's and lipase. Adequate fluids IV    F/u with me outpt. I will schedule.   Darek Byrne MD

## 2019-08-28 NOTE — PROGRESS NOTES
Late entry due to hands on patient care. Patient alert and oriented. Follow commands. Denies pain. Respiration even and unlabored. Spot oxygen saturation 98 % on room air. Head of bed elevated. Instructed patient to call for any needs. Verbalize an understanding. Call light within reach. Bed in low/lock position.

## 2019-08-28 NOTE — PROGRESS NOTES
Bedside and Verbal shift change report given to Charles Vandana Landa RN (oncoming nurse) by Mildred Clarke RN  (offgoing nurse). Report included the following information SBAR, Kardex, ED Summary, Procedure Summary, Intake/Output, Recent Results, Cardiac Rhythm NSR  and Alarm Parameters .  Dual skin assessment

## 2019-08-29 ENCOUNTER — APPOINTMENT (OUTPATIENT)
Dept: CT IMAGING | Age: 79
DRG: 439 | End: 2019-08-29
Attending: FAMILY MEDICINE
Payer: MEDICARE

## 2019-08-29 VITALS
WEIGHT: 175.7 LBS | OXYGEN SATURATION: 98 % | DIASTOLIC BLOOD PRESSURE: 79 MMHG | TEMPERATURE: 98 F | SYSTOLIC BLOOD PRESSURE: 138 MMHG | HEART RATE: 76 BPM | RESPIRATION RATE: 19 BRPM | BODY MASS INDEX: 23.8 KG/M2 | HEIGHT: 72 IN

## 2019-08-29 LAB
ALBUMIN SERPL-MCNC: 3.3 G/DL (ref 3.2–4.6)
ALBUMIN/GLOB SERPL: 1 {RATIO} (ref 1.2–3.5)
ALP SERPL-CCNC: 77 U/L (ref 50–136)
ALT SERPL-CCNC: 90 U/L (ref 12–65)
ANION GAP SERPL CALC-SCNC: 10 MMOL/L (ref 7–16)
APPEARANCE UR: ABNORMAL
AST SERPL-CCNC: 38 U/L (ref 15–37)
BACTERIA URNS QL MICRO: ABNORMAL /HPF
BILIRUB SERPL-MCNC: 2.1 MG/DL (ref 0.2–1.1)
BILIRUB UR QL: NEGATIVE
BUN SERPL-MCNC: 16 MG/DL (ref 8–23)
CALCIUM SERPL-MCNC: 8.1 MG/DL (ref 8.3–10.4)
CASTS URNS QL MICRO: ABNORMAL /LPF
CHLORIDE SERPL-SCNC: 105 MMOL/L (ref 98–107)
CO2 SERPL-SCNC: 24 MMOL/L (ref 21–32)
COLOR UR: YELLOW
CREAT SERPL-MCNC: 1.17 MG/DL (ref 0.8–1.5)
EPI CELLS #/AREA URNS HPF: 0 /HPF
GLOBULIN SER CALC-MCNC: 3.2 G/DL (ref 2.3–3.5)
GLUCOSE BLD STRIP.AUTO-MCNC: 112 MG/DL (ref 65–100)
GLUCOSE SERPL-MCNC: 116 MG/DL (ref 65–100)
GLUCOSE UR STRIP.AUTO-MCNC: NEGATIVE MG/DL
HGB UR QL STRIP: ABNORMAL
KETONES UR QL STRIP.AUTO: 15 MG/DL
LEUKOCYTE ESTERASE UR QL STRIP.AUTO: ABNORMAL
LIPASE SERPL-CCNC: 248 U/L (ref 73–393)
NITRITE UR QL STRIP.AUTO: NEGATIVE
PH UR STRIP: 5.5 [PH] (ref 5–9)
POTASSIUM SERPL-SCNC: 2.8 MMOL/L (ref 3.5–5.1)
POTASSIUM SERPL-SCNC: 3.8 MMOL/L (ref 3.5–5.1)
PROT SERPL-MCNC: 6.5 G/DL (ref 6.3–8.2)
PROT UR STRIP-MCNC: ABNORMAL MG/DL
RBC #/AREA URNS HPF: >100 /HPF
SODIUM SERPL-SCNC: 139 MMOL/L (ref 136–145)
SP GR UR REFRACTOMETRY: 1.01 (ref 1–1.02)
TROPONIN I SERPL-MCNC: 0.03 NG/ML (ref 0.02–0.05)
TROPONIN I SERPL-MCNC: 0.04 NG/ML (ref 0.02–0.05)
TROPONIN I SERPL-MCNC: <0.02 NG/ML (ref 0.02–0.05)
UROBILINOGEN UR QL STRIP.AUTO: 0.2 EU/DL (ref 0.2–1)
WBC URNS QL MICRO: >100 /HPF

## 2019-08-29 PROCEDURE — 80053 COMPREHEN METABOLIC PANEL: CPT

## 2019-08-29 PROCEDURE — 82962 GLUCOSE BLOOD TEST: CPT

## 2019-08-29 PROCEDURE — 36415 COLL VENOUS BLD VENIPUNCTURE: CPT

## 2019-08-29 PROCEDURE — 74011250636 HC RX REV CODE- 250/636: Performed by: HOSPITALIST

## 2019-08-29 PROCEDURE — 70450 CT HEAD/BRAIN W/O DYE: CPT

## 2019-08-29 PROCEDURE — 84484 ASSAY OF TROPONIN QUANT: CPT

## 2019-08-29 PROCEDURE — 74011250636 HC RX REV CODE- 250/636: Performed by: FAMILY MEDICINE

## 2019-08-29 PROCEDURE — 84132 ASSAY OF SERUM POTASSIUM: CPT

## 2019-08-29 PROCEDURE — 74011000258 HC RX REV CODE- 258: Performed by: FAMILY MEDICINE

## 2019-08-29 PROCEDURE — 83690 ASSAY OF LIPASE: CPT

## 2019-08-29 PROCEDURE — 77030020263 HC SOL INJ SOD CL0.9% LFCR 1000ML

## 2019-08-29 PROCEDURE — 93005 ELECTROCARDIOGRAM TRACING: CPT | Performed by: FAMILY MEDICINE

## 2019-08-29 PROCEDURE — 77010033678 HC OXYGEN DAILY

## 2019-08-29 RX ORDER — CEPHALEXIN 500 MG/1
500 CAPSULE ORAL 2 TIMES DAILY
Qty: 10 CAP | Refills: 0 | Status: SHIPPED | OUTPATIENT
Start: 2019-08-29

## 2019-08-29 RX ORDER — POTASSIUM CHLORIDE 14.9 MG/ML
20 INJECTION INTRAVENOUS
Status: COMPLETED | OUTPATIENT
Start: 2019-08-29 | End: 2019-08-29

## 2019-08-29 RX ADMIN — POTASSIUM CHLORIDE 20 MEQ: 200 INJECTION, SOLUTION INTRAVENOUS at 05:27

## 2019-08-29 RX ADMIN — POTASSIUM CHLORIDE 20 MEQ: 200 INJECTION, SOLUTION INTRAVENOUS at 09:27

## 2019-08-29 RX ADMIN — SODIUM CHLORIDE 125 ML/HR: 900 INJECTION, SOLUTION INTRAVENOUS at 03:33

## 2019-08-29 RX ADMIN — Medication 10 ML: at 05:27

## 2019-08-29 RX ADMIN — POTASSIUM CHLORIDE 20 MEQ: 200 INJECTION, SOLUTION INTRAVENOUS at 03:36

## 2019-08-29 RX ADMIN — CEFTRIAXONE 1 G: 1 INJECTION, POWDER, FOR SOLUTION INTRAMUSCULAR; INTRAVENOUS at 03:34

## 2019-08-29 RX ADMIN — POTASSIUM CHLORIDE 20 MEQ: 200 INJECTION, SOLUTION INTRAVENOUS at 07:28

## 2019-08-29 NOTE — PROGRESS NOTES
Care Management Interventions  PCP Verified by CM: No(Gave PCP list)  Mode of Transport at Discharge: Other (see comment)  Transition of Care Consult (CM Consult):  Other  Confirm Follow Up Transport: Family  Plan discussed with Pt/Family/Caregiver: Yes  Freedom of Choice Offered: Yes  Discharge Location  Discharge Placement: Home    Pt ready to d/c home with family, no further needs at this time, CM signing off

## 2019-08-29 NOTE — PROGRESS NOTES
Patient lab results back with trop <0.02, critical potassium level of 2.8, and urinalysis 3+ bacteria. Dr Scarlett Villalba called with results. Received orders for 1g rocephin q24h, urine culture, and electrolyte replacement protocol. Reviewed rocephin and PCN allergy with MD, benefit outweighs risk. Will cont to monitor patient.

## 2019-08-29 NOTE — PROGRESS NOTES
Problem: General Medical Care Plan  Goal: *Vital signs within specified parameters  Outcome: Progressing Towards Goal  Goal: *Optimal pain control at patient's stated goal  Outcome: Progressing Towards Goal  Goal: *Skin integrity maintained  Outcome: Progressing Towards Goal     Problem: Falls - Risk of  Goal: *Absence of Falls  Description  Document Michaela Girt Fall Risk and appropriate interventions in the flowsheet.   Outcome: Progressing Towards Goal  Note:   Fall Risk Interventions:  Mobility Interventions: Assess mobility with egress test, Bed/chair exit alarm, PT Consult for mobility concerns    Mentation Interventions: Adequate sleep, hydration, pain control, Bed/chair exit alarm, Increase mobility, Reorient patient, Toileting rounds    Medication Interventions: Bed/chair exit alarm, Assess postural VS orthostatic hypotension, Patient to call before getting OOB, Teach patient to arise slowly    Elimination Interventions: Bed/chair exit alarm, Call light in reach, Toilet paper/wipes in reach, Toileting schedule/hourly rounds, Stay With Me (per policy)    History of Falls Interventions: Bed/chair exit alarm, Investigate reason for fall, Vital signs minimum Q4HRs X 24 hrs (comment for end date)         Problem: Patient Education: Go to Patient Education Activity  Goal: Patient/Family Education  Outcome: Progressing Towards Goal     Problem: Delirium Treatment  Goal: *Level of consciousness restored to baseline  Outcome: Progressing Towards Goal  Goal: *Level of environmental perceptions restored to baseline  Outcome: Progressing Towards Goal  Goal: *Sensory perception restored to baseline  Outcome: Progressing Towards Goal  Goal: *Emotional stability restored to baseline  Outcome: Progressing Towards Goal  Goal: *Functional assessment restored to baseline  Outcome: Progressing Towards Goal  Goal: *Absence of falls  Outcome: Progressing Towards Goal  Goal: *Will remain free of delirium, CAM Score negative  Outcome: Progressing Towards Goal  Goal: *Cognitive status will be restored to baseline  Outcome: Progressing Towards Goal  Goal: Interventions  Outcome: Progressing Towards Goal     Problem: Patient Education: Go to Patient Education Activity  Goal: Patient/Family Education  Outcome: Progressing Towards Goal     Problem: Urinary Tract Infection - Adult  Goal: *Absence of infection signs and symptoms  Outcome: Progressing Towards Goal

## 2019-08-29 NOTE — PROGRESS NOTES
EKG results called to Dr Anna Parmar, received orders to trend troponin levels q4h. Will cont to monitor.

## 2019-08-29 NOTE — PROGRESS NOTES
Patient has been A/O x 4, up adlib with steady gait observed by RN x 2 with no gait disturbance noted. Patient attempted to get OOB to use bathroom at approx 0115 and this RN heard a thud. Entered room to find patient on floor, and very diaphoretic. Assisted patient back to bed. Patient denied hitting head, and denied pain. Pupils are equal and reactive, 4/brisk. Patient is able to state name and . He is unable to state where he is, but said he was at home and gave his address. Patient stated month as July, but unable to state year. VS taken.  with ST elevation noted on monitor. O2 sat 90% on room air, placed on 2L per NC. Glucose 112. Temp 97.4 orally. Patient bathed, condom cath placed, and patient instructed not to get OOB without assistance. Bed low/locked, alarm active, call light within reach. Dr Damon called and notified of patient situation and fall. Orders received for 12 lead EKG, troponin level, and head CT. Orders placed. Will cont to monitor.

## 2019-08-29 NOTE — PROGRESS NOTES
Patient called this RN into room and was shaking, and breathing heavily. Patient stated he did not have any pain, felt cold, and that he just started shaking and didn't know why. VS taken-see chart, and are stable. Temp 97.5, orally and Blood pressure 189/94. Upon assessment of patient RN noted small blood stains around joao area. Patient stated he was having some blood in his urine. Instructed patient next time he needs to void to go in urinal- clean urinal provided- so we could send urine sample to lab. Patient denies hx of diabetes, blood glucose checked with result of 91. Patient is not diaphoretic. Provided patient with warm blanket. Dr Scarlett Villalba called and notified of patient episode. Received orders for U/A. Will cont to monitor.

## 2019-08-29 NOTE — INTERDISCIPLINARY ROUNDS
Interdisciplinary team rounds were held 8/29/2019 with the following team members:Care Management, Nursing, Pastoral Care, Physical Therapy and Physician and the patient. Plan of care discussed. See clinical pathway and/or care plan for interventions and desired outcomes.

## 2019-08-29 NOTE — PROGRESS NOTES
Patient sighing frequently and moaning out loud frequently. This RN went to bedside to assess for pain or discomfort. Patient denies pain at this time, and denies any SOB. No change from previous assessment. Will cont to monitor.

## 2019-08-29 NOTE — ROUTINE PROCESS
Patient advised to take all of his antibiotic. Patient discharged via wheelchair accompanied by cousin. Discharge instructions and presricption reviewed and questions answered. No acute distress noted at discharge. DISCHARGE SUMMARY from Nurse    PATIENT INSTRUCTIONS:    After general anesthesia or intravenous sedation, for 24 hours or while taking prescription Narcotics:  · Limit your activities  · Do not drive and operate hazardous machinery  · Do not make important personal or business decisions  · Do  not drink alcoholic beverages  · If you have not urinated within 8 hours after discharge, please contact your surgeon on call. Report the following to your surgeon:  · Excessive pain, swelling, redness or odor of or around the surgical area  · Temperature over 100.5  · Nausea and vomiting lasting longer than 4 hours or if unable to take medications  · Any signs of decreased circulation or nerve impairment to extremity: change in color, persistent  numbness, tingling, coldness or increase pain  · Any questions    What to do at Home:  Recommended activity: Activity as tolerated,     If you experience any of the following symptoms pain, shortness of breath, nausea, vomiting or diarrhea  , please follow up with ER. *  Please give a list of your current medications to your Primary Care Provider. *  Please update this list whenever your medications are discontinued, doses are      changed, or new medications (including over-the-counter products) are added. *  Please carry medication information at all times in case of emergency situations. These are general instructions for a healthy lifestyle:    No smoking/ No tobacco products/ Avoid exposure to second hand smoke  Surgeon General's Warning:  Quitting smoking now greatly reduces serious risk to your health.     Obesity, smoking, and sedentary lifestyle greatly increases your risk for illness    A healthy diet, regular physical exercise & weight monitoring are important for maintaining a healthy lifestyle    You may be retaining fluid if you have a history of heart failure or if you experience any of the following symptoms:  Weight gain of 3 pounds or more overnight or 5 pounds in a week, increased swelling in our hands or feet or shortness of breath while lying flat in bed. Please call your doctor as soon as you notice any of these symptoms; do not wait until your next office visit. The discharge information has been reviewed with the patient. The patient verbalized understanding. Discharge medications reviewed with the patient and appropriate educational materials and side effects teaching were provided.   ___________________________________________________________________________________________________________________________________

## 2019-08-29 NOTE — PROGRESS NOTES
Problem: General Medical Care Plan  Goal: *Vital signs within specified parameters  Outcome: Progressing Towards Goal  Goal: *Optimal pain control at patient's stated goal  Outcome: Progressing Towards Goal  Goal: *Skin integrity maintained  Outcome: Progressing Towards Goal     Problem: Falls - Risk of  Goal: *Absence of Falls  Description  Document Claudiomelissa Eduar Fall Risk and appropriate interventions in the flowsheet.   Outcome: Progressing Towards Goal  Note:   Fall Risk Interventions:  Mobility Interventions: Assess mobility with egress test, Bed/chair exit alarm, Communicate number of staff needed for ambulation/transfer, Patient to call before getting OOB    Mentation Interventions: Adequate sleep, hydration, pain control, Bed/chair exit alarm, Door open when patient unattended, Evaluate medications/consider consulting pharmacy, Increase mobility, More frequent rounding, Reorient patient, Room close to nurse's station, Toileting rounds, Update white board    Medication Interventions: Assess postural VS orthostatic hypotension, Bed/chair exit alarm, Evaluate medications/consider consulting pharmacy, Patient to call before getting OOB, Teach patient to arise slowly    Elimination Interventions: Bed/chair exit alarm, Call light in reach, Elevated toilet seat, Patient to call for help with toileting needs, Stay With Me (per policy), Toilet paper/wipes in reach, Toileting schedule/hourly rounds, Urinal in reach    History of Falls Interventions: Bed/chair exit alarm, Consult care management for discharge planning, Door open when patient unattended, Evaluate medications/consider consulting pharmacy, Investigate reason for fall, Room close to nurse's station, Utilize gait belt for transfer/ambulation, Assess for delayed presentation/identification of injury for 48 hrs (comment for end date), Vital signs minimum Q4HRs X 24 hrs (comment for end date)         Problem: Patient Education: Go to Patient Education Activity  Goal: Patient/Family Education  Outcome: Progressing Towards Goal     Problem: Delirium Treatment  Goal: *Level of consciousness restored to baseline  Outcome: Progressing Towards Goal  Goal: *Level of environmental perceptions restored to baseline  Outcome: Progressing Towards Goal  Goal: *Sensory perception restored to baseline  Outcome: Progressing Towards Goal  Goal: *Emotional stability restored to baseline  Outcome: Progressing Towards Goal  Goal: *Functional assessment restored to baseline  Outcome: Progressing Towards Goal  Goal: *Absence of falls  Outcome: Progressing Towards Goal  Goal: *Will remain free of delirium, CAM Score negative  Outcome: Progressing Towards Goal  Goal: *Cognitive status will be restored to baseline  Outcome: Progressing Towards Goal  Goal: Interventions  Outcome: Progressing Towards Goal     Problem: Patient Education: Go to Patient Education Activity  Goal: Patient/Family Education  Outcome: Progressing Towards Goal     Problem: Urinary Tract Infection - Adult  Goal: *Absence of infection signs and symptoms  Outcome: Progressing Towards Goal

## 2019-08-29 NOTE — DISCHARGE SUMMARY
Hospitalist Discharge Summary     Patient ID:  Suzie Marino  [id number]  88 y.o.  1940  Admit date: 8/27/2019  2:04 AM  Discharge date and time: 8/29/2019  Attending: Adonis Power MD  PCP:  Rodolfo oWmack MD  Treatment Team: Attending Provider: Adonis Power MD; Consulting Provider: Mahsa Fitch MD; Utilization Review: Gilma Gibbs RN; Care Manager: Rosie Quevedo RN; Primary Nurse: Negar Cruz RN    Principal Diagnosis Acute pancreatitis   Principal Problem:    Acute pancreatitis (8/27/2019)    Active Problems:    Renal cell cancer, right ([address] (8/27/2019)             Hospital Course:  Please refer to the admission H&P for details of presentation. In summary, the patient is a 78yo M with hx renal mass (suspected RCC) and prior ivette for ruptured gallbladder who presented with acute pancreatitis. Managed with IV fluids and antiemetics, pain control. Symptoms and labs improved, diet advanced and tolerated. Pt also found to have UTI (cx pending) and given rocephin, will transition to keflex at discharge. Urology was consulted to discuss renal mass with patient. RCC is suspected and there are no signs currently of metastasis. Pt refusing surgery or other treatment at this time, but has been encouraged to reconsider. The patient agrees to call urology office in the next week with decision about surgery or no. Significant Diagnostic Studies:   CT HEAD WO CONT   Final Result   IMPRESSION:      Unremarkable brain CT without intravenous contrast.      Date of Dictation: 8/29/2019 2:35 AM         CT ABD PELV W CONT   Final Result   IMPRESSION:      Increased size of right renal mass consistent with renal cell carcinoma. Urology   consultation is recommended. Small left inguinal hernia. Cholecystectomy. Enlarged prostate gland. Coronary artery disease with aortic valvular calcification. Linear atelectasis of the right lower lobe.       Date of Dictation: 8/27/2019 4:13 AM            XR ABD ACUTE W 1 V CHEST   Final Result   IMPRESSION:  No acute findings in the chest or abdomen. Labs: Results:       Chemistry Recent Labs     08/29/19 0209 08/28/19 0421 08/27/19 0216   * 85 150*    142 138   K 2.8* 3.2* 3.7    107 105   CO2 24 28 26   BUN 16 14 23   CREA 1.17 1.09 1.33   CA 8.1* 8.3 9.3   AGAP 10 7 7   AP 77 86 100   TP 6.5 6.3 8.2   ALB 3.3 3.3 4.5   GLOB 3.2 3.0 3.7*   AGRAT 1.0* 1.1* 1.2      CBC w/Diff Recent Labs     08/28/19 0421 08/27/19 0216   WBC 9.5 12.0*   RBC 4.20* 4.99   HGB 12.9* 15.4   HCT 39.7* 46.8    234   GRANS  --  89*   LYMPH  --  6*   EOS  --  0*      Cardiac Enzymes No results for input(s): CPK, CKND1, LINNETTE in the last 72 hours. No lab exists for component: CKRMB, TROIP   Coagulation No results for input(s): PTP, INR, APTT in the last 72 hours. No lab exists for component: INREXT    Lipid Panel No results found for: CHOL, CHOLPOCT, CHOLX, CHLST, CHOLV, 976464, HDL, LDL, LDLC, DLDLP, 109001, VLDLC, VLDL, TGLX, TRIGL, TRIGP, TGLPOCT, CHHD, CHHDX   BNP No results for input(s): BNPP in the last 72 hours. Liver Enzymes Recent Labs     08/29/19 0209   TP 6.5   ALB 3.3   AP 77   SGOT 38*      Thyroid Studies No results found for: T4, T3U, TSH, TSHEXT         Discharge Exam:  Visit Vitals  /67   Pulse 70   Temp 98.5 °F (36.9 °C)   Resp 19   Ht 6' (1.829 m)   Wt 79.7 kg (175 lb 11.2 oz)   SpO2 95%   BMI 23.83 kg/m²     General appearance: alert, cooperative, no distress, appears stated age   Lungs: clear to auscultation bilaterally  Heart: regular rate and rhythm, S1, S2 normal, no murmur, click, rub or gallop  Abdomen: soft, non-tender.  Bowel sounds normal. No masses,  no organomegaly  Extremities: no cyanosis or edema  Neurologic: Grossly normal    Disposition: home  Discharge Condition: stable  Patient Instructions:   Current Discharge Medication List      START taking these medications Details   cephALEXin (KEFLEX) 500 mg capsule Take 1 Cap by mouth two (2) times a day. Qty: 10 Cap, Refills: 0             Activity: Activity as tolerated  Diet: low fat     Follow-up:     Follow-up Appointments   Procedures    FOLLOW UP VISIT Appointment in: Other (Specify) Pt should call Dr. Herve Peterson office if he decides to pursue treatment of kidney cancer. Cyrus's office to call for GI follow-up. Pt should call Dr. Herve Peterson office if he decides to pursue treatment of kidney cancer. Cyrus's office to call for GI follow-up. Standing Status:   Standing     Number of Occurrences:   1     Order Specific Question:   Appointment in     Answer:    Other (Specify)           Time spent to discharge patient 35 minutes  Signed:  Kye Turpin MD  8/29/2019  10:48 AM

## 2019-08-29 NOTE — PROGRESS NOTES
Problem: General Medical Care Plan  Goal: *Vital signs within specified parameters  8/29/2019 1059 by Mushtaq Jacobs RN  Outcome: Resolved/Met  8/29/2019 0834 by Mushtaq Jacobs RN  Outcome: Progressing Towards Goal  Goal: *Optimal pain control at patient's stated goal  8/29/2019 1059 by Mushtaq Jacobs RN  Outcome: Resolved/Met  8/29/2019 0834 by Mushtaq Jacobs RN  Outcome: Progressing Towards Goal  Goal: *Skin integrity maintained  8/29/2019 1059 by Mushtaq Jacobs RN  Outcome: Resolved/Met  8/29/2019 0834 by Mushtaq Jacobs RN  Outcome: Progressing Towards Goal     Problem: Falls - Risk of  Goal: *Absence of Falls  Description  Document Milta Raring Fall Risk and appropriate interventions in the flowsheet.   8/29/2019 1059 by Mushtaq Jacobs RN  Outcome: Resolved/Met  Note:   Fall Risk Interventions:  Mobility Interventions: Assess mobility with egress test, Bed/chair exit alarm, PT Consult for mobility concerns    Mentation Interventions: Adequate sleep, hydration, pain control, Bed/chair exit alarm, Increase mobility, Reorient patient, Toileting rounds    Medication Interventions: Bed/chair exit alarm, Assess postural VS orthostatic hypotension, Patient to call before getting OOB, Teach patient to arise slowly    Elimination Interventions: Bed/chair exit alarm, Call light in reach, Toilet paper/wipes in reach, Toileting schedule/hourly rounds, Stay With Me (per policy)    History of Falls Interventions: Bed/chair exit alarm, Investigate reason for fall, Vital signs minimum Q4HRs X 24 hrs (comment for end date)      8/29/2019 0834 by Mushtaq Jacobs RN  Outcome: Progressing Towards Goal  Note:   Fall Risk Interventions:  Mobility Interventions: Assess mobility with egress test, Bed/chair exit alarm, PT Consult for mobility concerns    Mentation Interventions: Adequate sleep, hydration, pain control, Bed/chair exit alarm, Increase mobility, Reorient patient, Toileting rounds    Medication Interventions: Bed/chair exit alarm, Assess postural VS orthostatic hypotension, Patient to call before getting OOB, Teach patient to arise slowly    Elimination Interventions: Bed/chair exit alarm, Call light in reach, Toilet paper/wipes in reach, Toileting schedule/hourly rounds, Stay With Me (per policy)    History of Falls Interventions: Bed/chair exit alarm, Investigate reason for fall, Vital signs minimum Q4HRs X 24 hrs (comment for end date)         Problem: Patient Education: Go to Patient Education Activity  Goal: Patient/Family Education  8/29/2019 1059 by Shaan Mejía RN  Outcome: Resolved/Met  8/29/2019 0834 by Shaan Mejía RN  Outcome: Progressing Towards Goal     Problem: Delirium Treatment  Goal: *Level of consciousness restored to baseline  8/29/2019 1059 by Shaan Mejía RN  Outcome: Resolved/Met  8/29/2019 0834 by Shaan Mejía RN  Outcome: Progressing Towards Goal  Goal: *Level of environmental perceptions restored to baseline  8/29/2019 1059 by Shaan Mejía RN  Outcome: Resolved/Met  8/29/2019 0834 by Shaan Mejía RN  Outcome: Progressing Towards Goal  Goal: *Sensory perception restored to baseline  8/29/2019 1059 by Shaan Mejía RN  Outcome: Resolved/Met  8/29/2019 0834 by Shaan Mejía RN  Outcome: Progressing Towards Goal  Goal: *Emotional stability restored to baseline  8/29/2019 1059 by Shaan Mejía RN  Outcome: Resolved/Met  8/29/2019 0834 by Shaan Mejía RN  Outcome: Progressing Towards Goal  Goal: *Functional assessment restored to baseline  8/29/2019 1059 by Shaan Mejía RN  Outcome: Resolved/Met  8/29/2019 0834 by Shaan Mejía RN  Outcome: Progressing Towards Goal  Goal: *Absence of falls  8/29/2019 1059 by Shaan Mejía RN  Outcome: Resolved/Met  8/29/2019 0834 by Shaan Mejía RN  Outcome: Progressing Towards Goal  Goal: *Will remain free of delirium, CAM Score negative  8/29/2019 1059 by Jose Manuel Norton RN  Outcome: Resolved/Met  8/29/2019 0834 by Jose Manuel Norton RN  Outcome: Progressing Towards Goal  Goal: *Cognitive status will be restored to baseline  8/29/2019 1059 by Jose Manuel Norton RN  Outcome: Resolved/Met  8/29/2019 0834 by Jose Manuel Norton RN  Outcome: Progressing Towards Goal  Goal: Interventions  8/29/2019 1059 by Jose Manuel Norton RN  Outcome: Resolved/Met  8/29/2019 0834 by Jose Manuel Norton RN  Outcome: Progressing Towards Goal     Problem: Patient Education: Go to Patient Education Activity  Goal: Patient/Family Education  8/29/2019 1059 by Jose Manuel Norton RN  Outcome: Resolved/Met  8/29/2019 0834 by Jose Manuel Norton RN  Outcome: Progressing Towards Goal     Problem: Urinary Tract Infection - Adult  Goal: *Absence of infection signs and symptoms  8/29/2019 1059 by Jose Manuel Norton RN  Outcome: Resolved/Met  8/29/2019 0834 by Jose Manuel Norton RN  Outcome: Progressing Towards Goal

## 2019-08-30 LAB
ATRIAL RATE: 100 BPM
CALCULATED P AXIS, ECG09: 53 DEGREES
CALCULATED R AXIS, ECG10: -51 DEGREES
CALCULATED T AXIS, ECG11: 28 DEGREES
DIAGNOSIS, 93000: NORMAL
P-R INTERVAL, ECG05: 270 MS
Q-T INTERVAL, ECG07: 362 MS
QRS DURATION, ECG06: 144 MS
QTC CALCULATION (BEZET), ECG08: 466 MS
VENTRICULAR RATE, ECG03: 100 BPM

## 2019-08-31 LAB
BACTERIA SPEC CULT: NORMAL
SERVICE CMNT-IMP: NORMAL

## 2022-03-19 PROBLEM — C64.1 RENAL CELL CANCER, RIGHT (HCC): Status: ACTIVE | Noted: 2019-08-27

## 2022-03-19 PROBLEM — K85.90 ACUTE PANCREATITIS: Status: ACTIVE | Noted: 2019-08-27

## 2022-07-07 ENCOUNTER — HOSPITAL ENCOUNTER (EMERGENCY)
Dept: CT IMAGING | Age: 82
Discharge: HOME | DRG: 690 | End: 2022-07-10
Payer: MEDICARE

## 2022-07-07 ENCOUNTER — HOSPITAL ENCOUNTER (EMERGENCY)
Age: 82
Discharge: ANOTHER HOSPITAL | DRG: 690 | End: 2022-07-08
Attending: EMERGENCY MEDICINE
Payer: MEDICARE

## 2022-07-07 DIAGNOSIS — N28.89 RIGHT KIDNEY MASS: ICD-10-CM

## 2022-07-07 DIAGNOSIS — R10.9 RIGHT FLANK PAIN: ICD-10-CM

## 2022-07-07 DIAGNOSIS — N17.9 AKI (ACUTE KIDNEY INJURY) (HCC): Primary | ICD-10-CM

## 2022-07-07 DIAGNOSIS — D72.829 LEUKOCYTOSIS, UNSPECIFIED TYPE: ICD-10-CM

## 2022-07-07 DIAGNOSIS — N20.1 RIGHT URETERAL STONE: ICD-10-CM

## 2022-07-07 LAB
ALBUMIN SERPL-MCNC: 3.9 G/DL (ref 3.2–4.6)
ALBUMIN/GLOB SERPL: 1 {RATIO} (ref 1.2–3.5)
ALP SERPL-CCNC: 56 U/L (ref 50–136)
ALT SERPL-CCNC: 16 U/L (ref 12–65)
ANION GAP SERPL CALC-SCNC: 9 MMOL/L (ref 7–16)
APPEARANCE UR: ABNORMAL
AST SERPL-CCNC: 29 U/L (ref 15–37)
BACTERIA URNS QL MICRO: ABNORMAL /HPF
BASOPHILS # BLD: 0 K/UL (ref 0–0.2)
BASOPHILS NFR BLD: 0 % (ref 0–2)
BILIRUB SERPL-MCNC: 2.3 MG/DL (ref 0.2–1.1)
BILIRUB UR QL: NEGATIVE
BUN SERPL-MCNC: 27 MG/DL (ref 8–23)
CALCIUM SERPL-MCNC: 9.3 MG/DL (ref 8.3–10.4)
CASTS URNS QL MICRO: ABNORMAL /LPF
CHLORIDE SERPL-SCNC: 102 MMOL/L (ref 98–107)
CO2 SERPL-SCNC: 24 MMOL/L (ref 21–32)
COLOR UR: ABNORMAL
CREAT SERPL-MCNC: 2.14 MG/DL (ref 0.8–1.5)
DIFFERENTIAL METHOD BLD: ABNORMAL
EOSINOPHIL # BLD: 0 K/UL (ref 0–0.8)
EOSINOPHIL NFR BLD: 0 % (ref 0.5–7.8)
EPI CELLS #/AREA URNS HPF: ABNORMAL /HPF
ERYTHROCYTE [DISTWIDTH] IN BLOOD BY AUTOMATED COUNT: 13.3 % (ref 11.9–14.6)
GLOBULIN SER CALC-MCNC: 4.1 G/DL (ref 2.3–3.5)
GLUCOSE SERPL-MCNC: 110 MG/DL (ref 65–100)
GLUCOSE UR STRIP.AUTO-MCNC: NEGATIVE MG/DL
HCT VFR BLD AUTO: 41.6 % (ref 41.1–50.3)
HGB BLD-MCNC: 14.5 G/DL (ref 13.6–17.2)
HGB UR QL STRIP: ABNORMAL
IMM GRANULOCYTES # BLD AUTO: 0.1 K/UL (ref 0–0.5)
IMM GRANULOCYTES NFR BLD AUTO: 0 % (ref 0–5)
KETONES UR QL STRIP.AUTO: 15 MG/DL
LACTATE SERPL-SCNC: <0.1 MMOL/L (ref 0.4–2)
LEUKOCYTE ESTERASE UR QL STRIP.AUTO: ABNORMAL
LIPASE SERPL-CCNC: 68 U/L (ref 73–393)
LYMPHOCYTES # BLD: 1 K/UL (ref 0.5–4.6)
LYMPHOCYTES NFR BLD: 8 % (ref 13–44)
MCH RBC QN AUTO: 30.9 PG (ref 26.1–32.9)
MCHC RBC AUTO-ENTMCNC: 34.9 G/DL (ref 31.4–35)
MCV RBC AUTO: 88.5 FL (ref 79.6–97.8)
MONOCYTES # BLD: 1.3 K/UL (ref 0.1–1.3)
MONOCYTES NFR BLD: 10 % (ref 4–12)
NEUTS SEG # BLD: 10.8 K/UL (ref 1.7–8.2)
NEUTS SEG NFR BLD: 82 % (ref 43–78)
NITRITE UR QL STRIP.AUTO: NEGATIVE
NRBC # BLD: 0 K/UL (ref 0–0.2)
OTHER OBSERVATIONS: ABNORMAL
PH UR STRIP: 6.5 [PH] (ref 5–9)
PLATELET # BLD AUTO: 238 K/UL (ref 150–450)
PMV BLD AUTO: 10 FL (ref 9.4–12.3)
POTASSIUM SERPL-SCNC: 3.5 MMOL/L (ref 3.5–5.1)
PROCALCITONIN SERPL-MCNC: 2.22 NG/ML (ref 0–0.49)
PROT SERPL-MCNC: 8 G/DL (ref 6.3–8.2)
PROT UR STRIP-MCNC: 30 MG/DL
RBC # BLD AUTO: 4.7 M/UL (ref 4.23–5.6)
RBC #/AREA URNS HPF: ABNORMAL /HPF
SODIUM SERPL-SCNC: 135 MMOL/L (ref 136–145)
SP GR UR REFRACTOMETRY: 1.02 (ref 1–1.02)
UROBILINOGEN UR QL STRIP.AUTO: 0.2 EU/DL (ref 0.2–1)
WBC # BLD AUTO: 13.3 K/UL (ref 4.3–11.1)
WBC URNS QL MICRO: >100 /HPF

## 2022-07-07 PROCEDURE — 80053 COMPREHEN METABOLIC PANEL: CPT

## 2022-07-07 PROCEDURE — 99285 EMERGENCY DEPT VISIT HI MDM: CPT

## 2022-07-07 PROCEDURE — 96374 THER/PROPH/DIAG INJ IV PUSH: CPT

## 2022-07-07 PROCEDURE — 83690 ASSAY OF LIPASE: CPT

## 2022-07-07 PROCEDURE — 6360000002 HC RX W HCPCS: Performed by: EMERGENCY MEDICINE

## 2022-07-07 PROCEDURE — 2580000003 HC RX 258: Performed by: EMERGENCY MEDICINE

## 2022-07-07 PROCEDURE — 74176 CT ABD & PELVIS W/O CONTRAST: CPT

## 2022-07-07 PROCEDURE — 87086 URINE CULTURE/COLONY COUNT: CPT

## 2022-07-07 PROCEDURE — 81001 URINALYSIS AUTO W/SCOPE: CPT

## 2022-07-07 PROCEDURE — 96361 HYDRATE IV INFUSION ADD-ON: CPT

## 2022-07-07 PROCEDURE — 84145 PROCALCITONIN (PCT): CPT

## 2022-07-07 PROCEDURE — 96365 THER/PROPH/DIAG IV INF INIT: CPT

## 2022-07-07 PROCEDURE — 85025 COMPLETE CBC W/AUTO DIFF WBC: CPT

## 2022-07-07 PROCEDURE — 83605 ASSAY OF LACTIC ACID: CPT

## 2022-07-07 PROCEDURE — 87040 BLOOD CULTURE FOR BACTERIA: CPT

## 2022-07-07 RX ORDER — 0.9 % SODIUM CHLORIDE 0.9 %
1000 INTRAVENOUS SOLUTION INTRAVENOUS ONCE
Status: COMPLETED | OUTPATIENT
Start: 2022-07-07 | End: 2022-07-08

## 2022-07-07 RX ORDER — 0.9 % SODIUM CHLORIDE 0.9 %
1000 INTRAVENOUS SOLUTION INTRAVENOUS ONCE
Status: COMPLETED | OUTPATIENT
Start: 2022-07-07 | End: 2022-07-07

## 2022-07-07 RX ORDER — ONDANSETRON 2 MG/ML
4 INJECTION INTRAMUSCULAR; INTRAVENOUS
Status: COMPLETED | OUTPATIENT
Start: 2022-07-07 | End: 2022-07-07

## 2022-07-07 RX ORDER — 0.9 % SODIUM CHLORIDE 0.9 %
500 INTRAVENOUS SOLUTION INTRAVENOUS ONCE
Status: DISCONTINUED | OUTPATIENT
Start: 2022-07-07 | End: 2022-07-08 | Stop reason: HOSPADM

## 2022-07-07 RX ADMIN — CEFTRIAXONE 1000 MG: 1 INJECTION, POWDER, FOR SOLUTION INTRAMUSCULAR; INTRAVENOUS at 22:34

## 2022-07-07 RX ADMIN — SODIUM CHLORIDE 1000 ML: 9 INJECTION, SOLUTION INTRAVENOUS at 21:02

## 2022-07-07 RX ADMIN — SODIUM CHLORIDE 1000 ML: 9 INJECTION, SOLUTION INTRAVENOUS at 22:18

## 2022-07-07 RX ADMIN — ONDANSETRON 4 MG: 2 INJECTION INTRAMUSCULAR; INTRAVENOUS at 22:34

## 2022-07-07 ASSESSMENT — PAIN DESCRIPTION - FREQUENCY: FREQUENCY: CONTINUOUS

## 2022-07-07 ASSESSMENT — ENCOUNTER SYMPTOMS
HEMATOCHEZIA: 0
ANAL BLEEDING: 0
ABDOMINAL PAIN: 1
NAUSEA: 1
DIARRHEA: 0
BACK PAIN: 0
VOMITING: 1
COUGH: 0
SHORTNESS OF BREATH: 0
HEMATEMESIS: 0
RHINORRHEA: 0
CONSTIPATION: 0
SORE THROAT: 0

## 2022-07-07 ASSESSMENT — PAIN SCALES - GENERAL: PAINLEVEL_OUTOF10: 8

## 2022-07-07 ASSESSMENT — PAIN DESCRIPTION - PAIN TYPE: TYPE: ACUTE PAIN

## 2022-07-07 ASSESSMENT — PAIN DESCRIPTION - LOCATION: LOCATION: ABDOMEN

## 2022-07-07 ASSESSMENT — PAIN DESCRIPTION - DESCRIPTORS: DESCRIPTORS: CRAMPING;SHARP

## 2022-07-07 ASSESSMENT — PAIN DESCRIPTION - ORIENTATION: ORIENTATION: RIGHT;LOWER

## 2022-07-07 ASSESSMENT — PAIN - FUNCTIONAL ASSESSMENT: PAIN_FUNCTIONAL_ASSESSMENT: 0-10

## 2022-07-08 ENCOUNTER — ANESTHESIA (OUTPATIENT)
Dept: SURGERY | Age: 82
DRG: 690 | End: 2022-07-08
Payer: MEDICARE

## 2022-07-08 ENCOUNTER — HOSPITAL ENCOUNTER (INPATIENT)
Age: 82
LOS: 1 days | Discharge: HOME | DRG: 690 | End: 2022-07-09
Attending: INTERNAL MEDICINE
Payer: MEDICARE

## 2022-07-08 ENCOUNTER — ANESTHESIA EVENT (OUTPATIENT)
Dept: SURGERY | Age: 82
DRG: 690 | End: 2022-07-08
Payer: MEDICARE

## 2022-07-08 ENCOUNTER — TELEPHONE (OUTPATIENT)
Dept: UROLOGY | Age: 82
End: 2022-07-08

## 2022-07-08 VITALS
BODY MASS INDEX: 25.73 KG/M2 | SYSTOLIC BLOOD PRESSURE: 171 MMHG | RESPIRATION RATE: 18 BRPM | DIASTOLIC BLOOD PRESSURE: 93 MMHG | WEIGHT: 190 LBS | TEMPERATURE: 99.3 F | OXYGEN SATURATION: 98 % | HEIGHT: 72 IN | HEART RATE: 74 BPM

## 2022-07-08 DIAGNOSIS — N17.9 ACUTE KIDNEY INJURY (HCC): Primary | ICD-10-CM

## 2022-07-08 PROBLEM — N13.30 HYDRONEPHROSIS: Status: ACTIVE | Noted: 2022-07-08

## 2022-07-08 PROBLEM — N28.89 RENAL MASS: Status: ACTIVE | Noted: 2022-07-08

## 2022-07-08 PROBLEM — N39.0 UTI (URINARY TRACT INFECTION): Status: ACTIVE | Noted: 2022-07-08

## 2022-07-08 PROBLEM — N20.1 URETERAL STONE: Status: ACTIVE | Noted: 2022-07-08

## 2022-07-08 PROCEDURE — 1100000000 HC RM PRIVATE

## 2022-07-08 PROCEDURE — C1769 GUIDE WIRE: HCPCS | Performed by: UROLOGY

## 2022-07-08 PROCEDURE — 3600000004 HC SURGERY LEVEL 4 BASE: Performed by: UROLOGY

## 2022-07-08 PROCEDURE — 6360000002 HC RX W HCPCS: Performed by: NURSE ANESTHETIST, CERTIFIED REGISTERED

## 2022-07-08 PROCEDURE — 3600000014 HC SURGERY LEVEL 4 ADDTL 15MIN: Performed by: UROLOGY

## 2022-07-08 PROCEDURE — 7100000001 HC PACU RECOVERY - ADDTL 15 MIN: Performed by: UROLOGY

## 2022-07-08 PROCEDURE — 0TJB8ZZ INSPECTION OF BLADDER, VIA NATURAL OR ARTIFICIAL OPENING ENDOSCOPIC: ICD-10-PCS | Performed by: UROLOGY

## 2022-07-08 PROCEDURE — 99222 1ST HOSP IP/OBS MODERATE 55: CPT | Performed by: UROLOGY

## 2022-07-08 PROCEDURE — 3700000001 HC ADD 15 MINUTES (ANESTHESIA): Performed by: UROLOGY

## 2022-07-08 PROCEDURE — 7100000000 HC PACU RECOVERY - FIRST 15 MIN: Performed by: UROLOGY

## 2022-07-08 PROCEDURE — 6370000000 HC RX 637 (ALT 250 FOR IP): Performed by: FAMILY MEDICINE

## 2022-07-08 PROCEDURE — 6360000002 HC RX W HCPCS: Performed by: HOSPITALIST

## 2022-07-08 PROCEDURE — 3700000000 HC ANESTHESIA ATTENDED CARE: Performed by: UROLOGY

## 2022-07-08 PROCEDURE — 2580000003 HC RX 258: Performed by: HOSPITALIST

## 2022-07-08 PROCEDURE — 52005 CYSTO W/URTRL CATHJ: CPT | Performed by: UROLOGY

## 2022-07-08 PROCEDURE — 2580000003 HC RX 258: Performed by: NURSE ANESTHETIST, CERTIFIED REGISTERED

## 2022-07-08 PROCEDURE — 2500000003 HC RX 250 WO HCPCS: Performed by: NURSE ANESTHETIST, CERTIFIED REGISTERED

## 2022-07-08 PROCEDURE — 6360000002 HC RX W HCPCS: Performed by: FAMILY MEDICINE

## 2022-07-08 PROCEDURE — 2709999900 HC NON-CHARGEABLE SUPPLY: Performed by: UROLOGY

## 2022-07-08 PROCEDURE — 2580000003 HC RX 258: Performed by: ANESTHESIOLOGY

## 2022-07-08 RX ORDER — SODIUM CHLORIDE 0.9 % (FLUSH) 0.9 %
5-40 SYRINGE (ML) INJECTION EVERY 12 HOURS SCHEDULED
Status: DISCONTINUED | OUTPATIENT
Start: 2022-07-08 | End: 2022-07-08 | Stop reason: HOSPADM

## 2022-07-08 RX ORDER — ONDANSETRON 4 MG/1
4 TABLET, ORALLY DISINTEGRATING ORAL EVERY 8 HOURS PRN
Status: DISCONTINUED | OUTPATIENT
Start: 2022-07-08 | End: 2022-07-09 | Stop reason: HOSPADM

## 2022-07-08 RX ORDER — MORPHINE SULFATE 2 MG/ML
2 INJECTION, SOLUTION INTRAMUSCULAR; INTRAVENOUS EVERY 6 HOURS PRN
Status: DISCONTINUED | OUTPATIENT
Start: 2022-07-08 | End: 2022-07-09 | Stop reason: HOSPADM

## 2022-07-08 RX ORDER — OXYCODONE HYDROCHLORIDE 5 MG/1
5 TABLET ORAL PRN
Status: DISCONTINUED | OUTPATIENT
Start: 2022-07-08 | End: 2022-07-08 | Stop reason: HOSPADM

## 2022-07-08 RX ORDER — ONDANSETRON 2 MG/ML
4 INJECTION INTRAMUSCULAR; INTRAVENOUS
Status: DISCONTINUED | OUTPATIENT
Start: 2022-07-08 | End: 2022-07-08 | Stop reason: HOSPADM

## 2022-07-08 RX ORDER — SODIUM CHLORIDE, SODIUM LACTATE, POTASSIUM CHLORIDE, CALCIUM CHLORIDE 600; 310; 30; 20 MG/100ML; MG/100ML; MG/100ML; MG/100ML
INJECTION, SOLUTION INTRAVENOUS CONTINUOUS PRN
Status: DISCONTINUED | OUTPATIENT
Start: 2022-07-08 | End: 2022-07-08 | Stop reason: SDUPTHER

## 2022-07-08 RX ORDER — ENOXAPARIN SODIUM 100 MG/ML
40 INJECTION SUBCUTANEOUS DAILY
Status: DISCONTINUED | OUTPATIENT
Start: 2022-07-08 | End: 2022-07-09 | Stop reason: HOSPADM

## 2022-07-08 RX ORDER — ONDANSETRON 2 MG/ML
4 INJECTION INTRAMUSCULAR; INTRAVENOUS EVERY 6 HOURS PRN
Status: DISCONTINUED | OUTPATIENT
Start: 2022-07-08 | End: 2022-07-09 | Stop reason: HOSPADM

## 2022-07-08 RX ORDER — DEXAMETHASONE SODIUM PHOSPHATE 10 MG/ML
INJECTION INTRAMUSCULAR; INTRAVENOUS PRN
Status: DISCONTINUED | OUTPATIENT
Start: 2022-07-08 | End: 2022-07-08 | Stop reason: SDUPTHER

## 2022-07-08 RX ORDER — FENTANYL CITRATE 50 UG/ML
100 INJECTION, SOLUTION INTRAMUSCULAR; INTRAVENOUS
Status: DISCONTINUED | OUTPATIENT
Start: 2022-07-08 | End: 2022-07-08 | Stop reason: HOSPADM

## 2022-07-08 RX ORDER — MIDAZOLAM HYDROCHLORIDE 2 MG/2ML
2 INJECTION, SOLUTION INTRAMUSCULAR; INTRAVENOUS
Status: DISCONTINUED | OUTPATIENT
Start: 2022-07-08 | End: 2022-07-08 | Stop reason: HOSPADM

## 2022-07-08 RX ORDER — HYDROMORPHONE HYDROCHLORIDE 2 MG/ML
0.5 INJECTION, SOLUTION INTRAMUSCULAR; INTRAVENOUS; SUBCUTANEOUS EVERY 5 MIN PRN
Status: DISCONTINUED | OUTPATIENT
Start: 2022-07-08 | End: 2022-07-08 | Stop reason: HOSPADM

## 2022-07-08 RX ORDER — EPHEDRINE SULFATE/0.9% NACL/PF 50 MG/5 ML
SYRINGE (ML) INTRAVENOUS PRN
Status: DISCONTINUED | OUTPATIENT
Start: 2022-07-08 | End: 2022-07-08 | Stop reason: SDUPTHER

## 2022-07-08 RX ORDER — SODIUM CHLORIDE 0.9 % (FLUSH) 0.9 %
5-40 SYRINGE (ML) INJECTION PRN
Status: DISCONTINUED | OUTPATIENT
Start: 2022-07-08 | End: 2022-07-09 | Stop reason: HOSPADM

## 2022-07-08 RX ORDER — PROPOFOL 10 MG/ML
INJECTION, EMULSION INTRAVENOUS PRN
Status: DISCONTINUED | OUTPATIENT
Start: 2022-07-08 | End: 2022-07-08 | Stop reason: SDUPTHER

## 2022-07-08 RX ORDER — ACETAMINOPHEN 500 MG
1000 TABLET ORAL ONCE
Status: DISCONTINUED | OUTPATIENT
Start: 2022-07-08 | End: 2022-07-08 | Stop reason: HOSPADM

## 2022-07-08 RX ORDER — SODIUM CHLORIDE 0.9 % (FLUSH) 0.9 %
5-40 SYRINGE (ML) INJECTION EVERY 12 HOURS SCHEDULED
Status: DISCONTINUED | OUTPATIENT
Start: 2022-07-08 | End: 2022-07-09 | Stop reason: HOSPADM

## 2022-07-08 RX ORDER — ACETAMINOPHEN 325 MG/1
650 TABLET ORAL EVERY 6 HOURS PRN
Status: DISCONTINUED | OUTPATIENT
Start: 2022-07-08 | End: 2022-07-09 | Stop reason: HOSPADM

## 2022-07-08 RX ORDER — SODIUM CHLORIDE, SODIUM LACTATE, POTASSIUM CHLORIDE, CALCIUM CHLORIDE 600; 310; 30; 20 MG/100ML; MG/100ML; MG/100ML; MG/100ML
INJECTION, SOLUTION INTRAVENOUS CONTINUOUS
Status: DISCONTINUED | OUTPATIENT
Start: 2022-07-08 | End: 2022-07-08 | Stop reason: HOSPADM

## 2022-07-08 RX ORDER — DIPHENHYDRAMINE HYDROCHLORIDE 50 MG/ML
12.5 INJECTION INTRAMUSCULAR; INTRAVENOUS
Status: DISCONTINUED | OUTPATIENT
Start: 2022-07-08 | End: 2022-07-08 | Stop reason: HOSPADM

## 2022-07-08 RX ORDER — SODIUM CHLORIDE, SODIUM LACTATE, POTASSIUM CHLORIDE, CALCIUM CHLORIDE 600; 310; 30; 20 MG/100ML; MG/100ML; MG/100ML; MG/100ML
INJECTION, SOLUTION INTRAVENOUS CONTINUOUS
Status: DISCONTINUED | OUTPATIENT
Start: 2022-07-08 | End: 2022-07-09 | Stop reason: HOSPADM

## 2022-07-08 RX ORDER — SODIUM CHLORIDE 9 MG/ML
INJECTION, SOLUTION INTRAVENOUS PRN
Status: DISCONTINUED | OUTPATIENT
Start: 2022-07-08 | End: 2022-07-09 | Stop reason: HOSPADM

## 2022-07-08 RX ORDER — POLYETHYLENE GLYCOL 3350 17 G/17G
17 POWDER, FOR SOLUTION ORAL DAILY PRN
Status: DISCONTINUED | OUTPATIENT
Start: 2022-07-08 | End: 2022-07-09 | Stop reason: HOSPADM

## 2022-07-08 RX ORDER — LIDOCAINE HYDROCHLORIDE 20 MG/ML
INJECTION, SOLUTION EPIDURAL; INFILTRATION; INTRACAUDAL; PERINEURAL PRN
Status: DISCONTINUED | OUTPATIENT
Start: 2022-07-08 | End: 2022-07-08 | Stop reason: SDUPTHER

## 2022-07-08 RX ORDER — ACETAMINOPHEN 650 MG/1
650 SUPPOSITORY RECTAL EVERY 6 HOURS PRN
Status: DISCONTINUED | OUTPATIENT
Start: 2022-07-08 | End: 2022-07-09 | Stop reason: HOSPADM

## 2022-07-08 RX ORDER — HYDROCODONE BITARTRATE AND ACETAMINOPHEN 10; 325 MG/1; MG/1
1 TABLET ORAL EVERY 6 HOURS PRN
Status: DISCONTINUED | OUTPATIENT
Start: 2022-07-08 | End: 2022-07-09 | Stop reason: HOSPADM

## 2022-07-08 RX ORDER — SODIUM CHLORIDE 0.9 % (FLUSH) 0.9 %
5-40 SYRINGE (ML) INJECTION PRN
Status: DISCONTINUED | OUTPATIENT
Start: 2022-07-08 | End: 2022-07-08 | Stop reason: HOSPADM

## 2022-07-08 RX ORDER — OXYCODONE HYDROCHLORIDE 5 MG/1
10 TABLET ORAL PRN
Status: DISCONTINUED | OUTPATIENT
Start: 2022-07-08 | End: 2022-07-08 | Stop reason: HOSPADM

## 2022-07-08 RX ORDER — MAGNESIUM HYDROXIDE/ALUMINUM HYDROXICE/SIMETHICONE 120; 1200; 1200 MG/30ML; MG/30ML; MG/30ML
30 SUSPENSION ORAL EVERY 6 HOURS PRN
Status: DISCONTINUED | OUTPATIENT
Start: 2022-07-08 | End: 2022-07-09 | Stop reason: HOSPADM

## 2022-07-08 RX ORDER — HYDROMORPHONE HYDROCHLORIDE 2 MG/ML
0.25 INJECTION, SOLUTION INTRAMUSCULAR; INTRAVENOUS; SUBCUTANEOUS EVERY 5 MIN PRN
Status: DISCONTINUED | OUTPATIENT
Start: 2022-07-08 | End: 2022-07-08 | Stop reason: HOSPADM

## 2022-07-08 RX ORDER — LANOLIN ALCOHOL/MO/W.PET/CERES
6 CREAM (GRAM) TOPICAL NIGHTLY PRN
Status: DISCONTINUED | OUTPATIENT
Start: 2022-07-08 | End: 2022-07-09 | Stop reason: HOSPADM

## 2022-07-08 RX ADMIN — DEXAMETHASONE SODIUM PHOSPHATE 10 MG: 10 INJECTION INTRAMUSCULAR; INTRAVENOUS at 16:31

## 2022-07-08 RX ADMIN — HYDROCODONE BITARTRATE AND ACETAMINOPHEN 1 TABLET: 10; 325 TABLET ORAL at 14:29

## 2022-07-08 RX ADMIN — Medication 10 MG: at 16:27

## 2022-07-08 RX ADMIN — SODIUM CHLORIDE, POTASSIUM CHLORIDE, SODIUM LACTATE AND CALCIUM CHLORIDE: 600; 310; 30; 20 INJECTION, SOLUTION INTRAVENOUS at 18:26

## 2022-07-08 RX ADMIN — SODIUM CHLORIDE, SODIUM LACTATE, POTASSIUM CHLORIDE, AND CALCIUM CHLORIDE: 600; 310; 30; 20 INJECTION, SOLUTION INTRAVENOUS at 16:13

## 2022-07-08 RX ADMIN — PROPOFOL 150 MG: 10 INJECTION, EMULSION INTRAVENOUS at 16:22

## 2022-07-08 RX ADMIN — ONDANSETRON 4 MG: 2 INJECTION INTRAMUSCULAR; INTRAVENOUS at 16:31

## 2022-07-08 RX ADMIN — CEFTRIAXONE 1000 MG: 1 INJECTION, POWDER, FOR SOLUTION INTRAMUSCULAR; INTRAVENOUS at 22:08

## 2022-07-08 RX ADMIN — MORPHINE SULFATE 2 MG: 2 INJECTION, SOLUTION INTRAMUSCULAR; INTRAVENOUS at 11:08

## 2022-07-08 RX ADMIN — LIDOCAINE HYDROCHLORIDE 80 MG: 20 INJECTION, SOLUTION EPIDURAL; INFILTRATION; INTRACAUDAL; PERINEURAL at 16:22

## 2022-07-08 RX ADMIN — Medication 10 MG: at 16:31

## 2022-07-08 RX ADMIN — SODIUM CHLORIDE, PRESERVATIVE FREE 10 ML: 5 INJECTION INTRAVENOUS at 11:09

## 2022-07-08 ASSESSMENT — PAIN SCALES - GENERAL
PAINLEVEL_OUTOF10: 10
PAINLEVEL_OUTOF10: 0
PAINLEVEL_OUTOF10: 4
PAINLEVEL_OUTOF10: 0
PAINLEVEL_OUTOF10: 6
PAINLEVEL_OUTOF10: 4

## 2022-07-08 ASSESSMENT — PAIN DESCRIPTION - PAIN TYPE: TYPE: ACUTE PAIN

## 2022-07-08 ASSESSMENT — PAIN DESCRIPTION - ORIENTATION
ORIENTATION: RIGHT
ORIENTATION: RIGHT;LOWER

## 2022-07-08 ASSESSMENT — PAIN DESCRIPTION - ONSET: ONSET: ON-GOING

## 2022-07-08 ASSESSMENT — PAIN - FUNCTIONAL ASSESSMENT
PAIN_FUNCTIONAL_ASSESSMENT: PREVENTS OR INTERFERES SOME ACTIVE ACTIVITIES AND ADLS
PAIN_FUNCTIONAL_ASSESSMENT: PREVENTS OR INTERFERES SOME ACTIVE ACTIVITIES AND ADLS
PAIN_FUNCTIONAL_ASSESSMENT: 0-10

## 2022-07-08 ASSESSMENT — PAIN DESCRIPTION - DESCRIPTORS
DESCRIPTORS: ACHING
DESCRIPTORS: ACHING

## 2022-07-08 ASSESSMENT — PAIN DESCRIPTION - FREQUENCY: FREQUENCY: CONTINUOUS

## 2022-07-08 ASSESSMENT — PAIN DESCRIPTION - LOCATION
LOCATION: ABDOMEN
LOCATION: ABDOMEN

## 2022-07-08 NOTE — PROGRESS NOTES
TRANSFER - OUT REPORT:    Verbal report given to Pre op RN  on Al   being transferred to pre op  for ordered procedure       Report consisted of patient's Situation, Background, Assessment and   Recommendations(SBAR). Information from the following report(s) Nurse Handoff Report was reviewed with the receiving nurse. Lines:   Peripheral IV 07/07/22 Left;Proximal;Anterior Antecubital (Active)   Site Assessment Clean, dry & intact 07/08/22 0100   Line Status Blood return noted 07/08/22 0100   Line Care Cap changed 07/08/22 0100   Phlebitis Assessment No symptoms 07/08/22 0100   Infiltration Assessment 0 07/08/22 0100   Alcohol Cap Used Yes 07/08/22 0100   Dressing Status Clean, dry & intact 07/08/22 0100   Dressing Type Transparent 07/08/22 0100       Peripheral IV 07/07/22 Distal;Right; Anterior Cephalic (Active)   Site Assessment Clean, dry & intact 07/08/22 0100   Line Status Blood return noted 07/08/22 0100   Line Care Cap changed 07/08/22 0100   Phlebitis Assessment No symptoms 07/08/22 0100   Infiltration Assessment 0 07/08/22 0100   Alcohol Cap Used Yes 07/08/22 0100   Dressing Status Clean, dry & intact 07/08/22 0100   Dressing Type Transparent 07/08/22 0100       Peripheral IV 07/07/22 Left;Dorsal Forearm (Active)   Site Assessment Clean, dry & intact 07/08/22 0100   Line Status Blood return noted 07/08/22 0100   Line Care Cap changed 07/08/22 0100   Phlebitis Assessment No symptoms 07/08/22 0100   Infiltration Assessment 0 07/08/22 0100   Alcohol Cap Used Yes 07/08/22 0100   Dressing Status Clean, dry & intact 07/08/22 0100   Dressing Type Transparent 07/08/22 0100        Opportunity for questions and clarification was provided.       Patient transported with:transport

## 2022-07-08 NOTE — CONSULTS
[address] [place] [address]    Name:  Tha Lugo  MR#:  [id number]  :  1940  ACCOUNT #:  [de-identified]  DATE OF SERVICE:  2022      CONSULTING PHYSICIAN:  Dr. Kosta Dan:  Right ureteral calculus. HISTORY OF PRESENT ILLNESS:  A 74-year-old male who was seen by Dr. Zully Amezcua most recently in . At that time, he had been found to have a 5-cm stone in the right kidney. Surgical treatment had been recommended but the patient refused to consider surgery. The patient is currently admitted with some right lower quadrant pain. He did have some dysuria. No fever or chills. CT abdomen and pelvis without contrast from 2022 shows mild right hydronephrosis due to a 2-3-mm calculus at the right ureterovesical junction. There is a 2.5-cm stone within the urinary bladder similar to a CT in 2019. There is a large soft tissue mass at the upper pole of the right kidney which has increased in size and measures about 9.5 cm. This measured 7 cm on a CT in 2019 and 5.3 cm on the 2015 study. There is an enlarged prostate gland. There is a large fat-containing left inguinal hernia and small fat-containing right inguinal hernia appearing grossly similar to prior exam, but no evidence of colitis or diverticulitis. Lab work:  Creatinine is 2.14, up from 1.17 in 2019. His lactic acid is less than 0.1 but procalcitonin elevated at 2.22. The patient currently is having some right lower quadrant pain. He has no pain from his hernia. SURGERIES:  1.  Kidney stone. 2.  He had a laparoscopic cholecystectomy in the past.    SOCIAL HISTORY:  He is a former smoker. ALLERGIES:  NONE. MEDICATIONS:  No medicines. PHYSICAL EXAMINATION:  VITAL SIGNS:  Temp 98.2, pulse 67, respiration 17. GENERAL:  He is alert and oriented x3. LUNGS:  Clear. HEART:  Regular rate and rhythm. ABDOMEN:  He is mildly tender on the right upper quadrant without a mass.   GENITALS: A moderate-sized reducible hernia in the left inguinal area which is nontender. The testicles are without masses. LABORATORY DATA:  Creatinine is elevated as noted 2.14. White count elevated at 13.3 with a hemoglobin of 14.5. Urine shows greater than 100 WBC, 2+ bacteria, large leukocytes. ASSESSMENT:  1. A 3-mm right distal ureteral calculus. 2.  Probable urinary tract infection with elevated white count and elevated procalcitonin. 3.  A 9.5-cm right upper renal mass which has grown in size from 2015. 4.  Left inguinal hernia which is asymptomatic. 5.  Bladder stone. 6.  Benign prostatic hyperplasia. RECOMMENDATION:  Right stent placement with possible ureteroscopy and holmium laser lithotripsy. I discussed the risks of bleeding, infection, and ureteral trauma with the patient. He is aware that he will need a stent that will have to be removed at a later time. He is also aware that he likely has renal cancer on the right side that can be life-threatening and needs further staging and will also need to have treatment for his bladder stone in the future.       MD JONAS Arenas/[email or web address]  D:  07/08/2022 8:12  T:  07/08/2022 12:41  JOB #:  [id number]

## 2022-07-08 NOTE — ED NOTES
TRANSFER - OUT REPORT:    Verbal report given to [address] on [facility]  being transferred to [id number] [facility] for routine progression of patient care       Report consisted of patient's Situation, Background, Assessment and   Recommendations(SBAR). Information from the following report(s) Nurse Handoff Report, ED Encounter Summary, ED SBAR, [facility], Recent Results and Quality Measures was reviewed with the receiving nurse. Lines:   Peripheral IV 07/07/22 Left;Proximal;Anterior Antecubital (Active)       Peripheral IV 07/07/22 Distal;Right; Anterior Cephalic (Active)       Peripheral IV 07/07/22 Left;Dorsal Forearm (Active)        Opportunity for questions and clarification was provided.       Patient transported with:  Lucius Pyle RN  07/08/22 [id number]

## 2022-07-08 NOTE — PROGRESS NOTES
Consult dictated   Plan cysto, right stent, possible right ureteroscopy and laser lithotripsy today.

## 2022-07-08 NOTE — PERIOP NOTE
Date/Time of Note


Date/Time of Note


DATE: 5/9/19 


TIME: 10:31





Assessment/Plan


VTE Prophylaxis


Risk score (from Ns)>0 risk:  3


SCD applied (from Ns):  Yes


Pharmacological prophylaxis:  other





Lines/Catheters


IV Catheter Type (from [facility]):  Peripheral IV





Assessment/Plan


Hospital Course


S: Patient seen by GI team yesterday.  Did have a brief episode of rapid heart 


rate overnight, which did not sustain and is now well controlled.  Denies any 


epigastric pain.





O: VS - see below


PHYSICAL EXAMINATION:


GENERAL: lying in bed, answering questions appropriately, in no acute distress.


HEENT:  Pupils equal, round, reactive to light.  Extraocular muscles intact.


NECK:  Supple, no thyromegaly.


LUNGS:  Clear to auscultation bilaterally.


CARDIOVASCULAR:  S1, S2 heard.  No rubs or gallops.


ABDOMEN:  Tender to palpation in epigastric area, but otherwise no rebound or 


guarding.  Normal bowel sounds, nondistended.  


MUSCULOSKELETAL:  No lower extremity edema bilaterally.


NEUROLOGIC:  No focal deficits.


 








ASSESSMENT AND PLAN:  76-year-old female with prior history of hypertension, 


gastritis, and hypothyroidism who presents with epigastric pain for the last 2 


days. 





# Epigastric pain- likely secondary to gastritis.  Denies any upper or lower GI 


bleeding.  


   -Continue low-dose IV fluids.


   -Per GI consult, planning for EGD later today, follow-up post procedure 


results of this


   -Follow recommendations from PT, OT and speech therapy consult as well.  





#  Hypertension-blood pressure controlled this morning.  Patient takes multiple 


blood pressure medicines at home.  She did come in with mild borderline hyperte


nsive urgency, which is improved now.  


   - Continue current p.o. blood pressure medicines.  


   -  hydralazine IV and clonidine p.r.n. as well, systolic greater than 160.





#  History of hypothyroidism.  


   - Continue levothyroxine.  Follow up thyroid panel.





#  Gastrointestinal prophylaxis.  PPI.





#  DVT prophylaxis, SCDs.


Result Diagram:  


5/9/19 0543                                                                     


          5/9/19 0543





Results 24hrs





Laboratory Tests


 Test
                                5/8/19
11:08  5/8/19
11:09  5/9/19
05:43


 White Blood Count                           8.0                         8.1


 Red Blood Count                            4.56                        4.44


 Hemoglobin                                 13.1                        12.8


 Hematocrit                                 40.8                        39.7


 Mean Corpuscular Volume                    89.5                        89.4


 Mean Corpuscular Hemoglobin               28.7  L                     28.8  L


 Mean Corpuscular Hemoglobin
Concent       32.1  
  
                  32.2  



 Red Cell Distribution Width                13.2                        13.1


 Platelet Count                              355                         349


 Mean Platelet Volume                       10.2                       10.5  H


 Immature Granulocytes %                   0.400                      0.500  H


 Neutrophils %                              67.0                        65.7


 Lymphocytes %                              26.9                        27.5


 Monocytes %                                 4.6                         5.0


 Eosinophils %                               0.9                         0.9


 Basophils %                                 0.2                         0.4


 Nucleated Red Blood Cells %                 0.0                         0.0


 Immature Granulocytes #                   0.030                      0.040  H


 Neutrophils #                               5.4                         5.3


 Lymphocytes #                               2.2                         2.2


 Monocytes #                                 0.4                         0.4


 Eosinophils #                               0.1                         0.1


 Basophils #                                 0.0                         0.0


 Nucleated Red Blood Cells #                 0.0                         0.0


 Prothrombin Time                          11.7  L


 Prothrombin Time Ratio                      0.9


 INR International Normalized
Ratio        0.85  
  
             



 Activated Partial
Thromboplast Time       28.6  
  
             



 Sodium Level                                143                         142


 Potassium Level                             5.0                         3.8


 Chloride Level                              110                        111  H


 Carbon Dioxide Level                         22                          21


 Anion Gap                                    11                          10


 Blood Urea Nitrogen                          13                          10


 Creatinine                                 0.71                        0.65


 Est Glomerular Filtrat Rate
mL/min     
           
               



 Glucose Level                               116                         107


 Calcium Level                               9.9                         9.4


 Total Bilirubin                             0.3


 Direct Bilirubin                           0.00


 Indirect Bilirubin                          0.3


 Aspartate Amino Transf
(AST/SGOT)           24  
  
             



 Alanine Aminotransferase
(ALT/SGPT)         21  
  
             



 Alkaline Phosphatase                         86


 Troponin I                           < 0.012


 Total Protein                               7.8


 Albumin                                     4.2


 Globulin                                  3.60  H


 Albumin/Globulin Ratio                     1.16


 Lipase                                      182


 Free Thyroxine                                           1.50


 Hemoglobin A1c                                                          5.7


 Phosphorus Level                                                        4.1


 Magnesium Level                                                         2.0


 Triglycerides Level                                                     145


 Cholesterol Level                                                      268  H


 LDL Cholesterol, Calculated                                             186


 HDL Cholesterol                                                          53


 Cholesterol/HDL Ratio                                                   5.0


 Thyroid Stimulating Hormone
(TSH)    
             
                 0.607  









Exam/Review of Systems


Exam


Vitals





Vital Signs


  Date      Temp  Pulse  Resp  B/P (MAP)   Pulse Ox  O2          O2 Flow    FiO2


Time                                                 Delivery    Rate


    5/9/19  98.1     72    19      177/99        97


     07:07                          (125)


    5/8/19                                           Room Air


     17:17








Results


Results 24hrs





Laboratory Tests


 Test
                                5/8/19
11:08  5/8/19
11:09  5/9/19
05:43


 White Blood Count                           8.0                         8.1


 Red Blood Count                            4.56                        4.44


 Hemoglobin                                 13.1                        12.8


 Hematocrit                                 40.8                        39.7


 Mean Corpuscular Volume                    89.5                        89.4


 Mean Corpuscular Hemoglobin               28.7  L                     28.8  L


 Mean Corpuscular Hemoglobin
Concent       32.1  
  
                  32.2  



 Red Cell Distribution Width                13.2                        13.1


 Platelet Count                              355                         349


 Mean Platelet Volume                       10.2                       10.5  H


 Immature Granulocytes %                   0.400                      0.500  H


 Neutrophils %                              67.0                        65.7


 Lymphocytes %                              26.9                        27.5


 Monocytes %                                 4.6                         5.0


 Eosinophils %                               0.9                         0.9


 Basophils %                                 0.2                         0.4


 Nucleated Red Blood Cells %                 0.0                         0.0


 Immature Granulocytes #                   0.030                      0.040  H


 Neutrophils #                               5.4                         5.3


 Lymphocytes #                               2.2                         2.2


 Monocytes #                                 0.4                         0.4


 Eosinophils #                               0.1                         0.1


 Basophils #                                 0.0                         0.0


 Nucleated Red Blood Cells #                 0.0                         0.0


 Prothrombin Time                          11.7  L


 Prothrombin Time Ratio                      0.9


 INR International Normalized
Ratio        0.85  
  
             



 Activated Partial
Thromboplast Time       28.6  
  
             



 Sodium Level                                143                         142


 Potassium Level                             5.0                         3.8


 Chloride Level                              110                        111  H


 Carbon Dioxide Level                         22                          21


 Anion Gap                                    11                          10


 Blood Urea Nitrogen                          13                          10


 Creatinine                                 0.71                        0.65


 Est Glomerular Filtrat Rate
mL/min     
           
               



 Glucose Level                               116                         107


 Calcium Level                               9.9                         9.4


 Total Bilirubin                             0.3


 Direct Bilirubin                           0.00


 Indirect Bilirubin                          0.3


 Aspartate Amino Transf
(AST/SGOT)           24  
  
             



 Alanine Aminotransferase
(ALT/SGPT)         21  
  
             



 Alkaline Phosphatase                         86


 Troponin I                           < 0.012


 Total Protein                               7.8


 Albumin                                     4.2


 Globulin                                  3.60  H


 Albumin/Globulin Ratio                     1.16


 Lipase                                      182


 Free Thyroxine                                           1.50


 Hemoglobin A1c                                                          5.7


 Phosphorus Level                                                        4.1


 Magnesium Level                                                         2.0


 Triglycerides Level                                                     145


 Cholesterol Level                                                      268  H


 LDL Cholesterol, Calculated                                             186


 HDL Cholesterol                                                          53


 Cholesterol/HDL Ratio                                                   5.0


 Thyroid Stimulating Hormone
(TSH)    
             
                 0.607  









Medications


Medication





Current Medications


Pantoprazole (Protonix Iv) 40 mg BID@06,18 IV  Last administered on 5/9/19at 


06:05; Admin Dose 40 MG;  Start 5/8/19 at 18:00


Simethicone (Mylicon) 80 mg QID  PRN PO DISTENSION/GAS/BLOATING;  Start 5/8/19 


at 14:00


IV Flush (NS 3 ml) 3 ml PER PROTOCOL IV ;  Start 5/8/19 at 14:30


Ondansetron HCl (Zofran Inj) 4 mg Q6H  PRN IV NAUSEA/VOMITING Last administered 


on 5/9/19at 02:53; Admin Dose 4 MG;  Start 5/8/19 at 14:30


Acetaminophen (Tylenol Tab) 650 mg Q6H  PRN PO .PAIN 1-3 OR TEMP Last 


administered on 5/8/19at 23:59; Admin Dose 650 MG;  Start 5/8/19 at 14:30


Acetaminophen/ Hydrocodone Bitart (Norco (5/325)) 1 tab Q6H  PRN PO .MOD PAIN 4-


6;  Start 5/8/19 at 14:30


Morphine Sulfate (morphine) 2 mg Q4H  PRN IV .SEVERE PAIN 7-10;  Start 5/8/19 at


14:30


Docusate Sodium (Colace) 100 mg Q12H  PRN PO .CONSTIPATION;  Start 5/8/19 at 


14:30


Magnesium Hydroxide (Milk Of Mag) 30 ml DAILY  PRN PO .CONSTIPATION;  Start 


5/8/19 at 14:30


Sodium Chloride 1,000 ml @  75 mls/hr [id number] IV  Last administered on 5/8/19at 


20:13; Admin Dose 75 MLS/HR;  Start 5/8/19 at 14:06


Lorazepam (Ativan) 0.5 mg Q6H  PRN IV ANXIETY Last administered on 5/9/19at 


03:07; Admin Dose 0.5 MG;  Start 5/8/19 at 14:30


Albuterol/ Ipratropium (Duoneb) 3 ml Q4H RESP THERAPY  PRN HHN SHORTNESS OF JAMAR


ATH;  Start 5/8/19 at 14:30


Hydralazine HCl (Apresoline) 10 mg Q6H  PRN IV ELEVATED BLOOD PRESSURE;  Start 


5/8/19 at 14:30


Nitroglycerin (Nitroglycerin (Sl Tab) 0.4 Mg) 1 tab Q5M  PRN SL ANGINA;  Start 


5/8/19 at 14:30


Amlodipine Besylate (Norvasc) 5 mg DAILY PO  Last administered on 5/9/19at 


08:22; Admin Dose 5 MG;  Start 5/9/19 at 09:00


Clonidine (Catapres) 0.3 mg TID  PRN PO PRN SBP > 160;  Start 5/8/19 at 14:30


Betamethasone/ Clotrimazole (Lotrisone Cr) 1 applic BID TOP ;  Start 5/8/19 at 


21:00


Hydralazine HCl (Apresoline) 100 mg TID PO  Last administered on 5/9/19at 08:21;


Admin Dose 100 MG;  Start 5/8/19 at 21:00


Levothyroxine Sodium (Synthroid) 25 mcg BEFORE  BREAKFAST PO ;  Start 5/9/19 at 


07:00


Meclizine HCl (Antivert) 25 mg BID  PRN PO DIZZINESS;  Start 5/8/19 at 14:30


Mirtazapine (Remeron) 30 mg DAILY PO  Last administered on 5/9/19at 08:22; Admin


Dose 30 MG;  Start 5/9/19 at 09:00


Triamcinolone Acetonide (Kenalog 0.1% Cr) 1 applic TID TOP ;  Start 5/8/19 at 


21:00


Zolpidem Tartrate (Ambien) 5 mg QHS  PRN PO INSOMNIA Last administered on 


5/9/19at 00:05; Admin Dose 5 MG;  Start 5/8/19 at 14:30


Metoprolol Tartrate (Lopressor) 100 mg BID PO  Last administered on 5/9/19at 


08:21; Admin Dose 100 MG;  Start 5/8/19 at 21:00


Verapamil HCl (Isoptin Sr) 120 mg DAILY PO ;  Start 5/9/19 at 09:00


Atorvastatin Calcium (Lipitor) 80 mg DAILY@21 PO  Last administered on 5/8/19at 


20:24; Admin Dose 80 MG;  Start 5/8/19 at 21:00











MAXIMINO SAMANIEGO               May 9, 2019 10:34
updated re: pt status after security code verified.

## 2022-07-08 NOTE — H&P
Hospitalist DT admit and will follow. On arrival to [facility],  Patient appears comfortable. He has mild RLQ pain    Review of Systems:  10 systems reviewed and negative except as noted in HPI. Assessment & Plan:     Principal Problem:    UTI (urinary tract infection)  Plan:   1) Admit to med bed, Inpatient status due to medical complexity, comorbidities, and medically necessary treatment not readily available as outpatient as outlined below  2) Will continue IV abx started in ER  3) Continue IVF for sepsis with elevated procalcitonin, leukocytosis, UTI. 4) follow blood cultures  5) monitor and correct electrolytes    Active Problems:    Acute kidney injury ([facility] [place] [address]  Plan: creatinine has increased from 1.17-->2. 14. Getting IVF    Renal mass  Plan: does not want workup    Hydronephrosis  Plan: UROLOGY consulted    Ureteral stone  Plan: as above      Dispo/Discharge Planning:     HOME    Diet: ADULT DIET; Regular  VTE ppx: SCDs  Code status: Full Code    Hospital Problems:  Principal Problem:    UTI (urinary tract infection)  Active Problems:    Acute kidney injury ([facility] [place] [address]    Renal mass    Hydronephrosis    Ureteral stone  Resolved Problems:    * No resolved hospital problems. *       Past History:     Past Medical History:   Diagnosis Date    Kidney stone      Past Surgical History:   Procedure Laterality Date    HEENT      nose    LAP,CHOLECYSTECTOMY        Social History     Tobacco Use    Smoking status: Former Smoker    Smokeless tobacco: Not on file   Substance Use Topics    Alcohol use: No      Social History     Substance and Sexual Activity   Drug Use Not on file     Family History   Problem Relation Age of Onset    Cancer Mother       Family history reviewed and negative except as noted above. There is no immunization history on file for this patient.   Allergies   Allergen Reactions    Penicillins Other (See Comments)     Prior to Admit Medications:  No current outpatient medications      Objective:   No data found. Estimated body mass index is 25.77 kg/m² as calculated from the following:    Height as of 7/7/22: 6' (1.829 m). Weight as of 7/7/22: 190 lb (86.2 kg). No intake or output data in the 24 hours ending 07/08/22 0300      Physical Exam:    There were no vitals taken for this visit. General:    Well nourished. Head:  Normocephalic, atraumatic  Eyes:  Sclerae appear normal.  Pupils equally round. ENT:  Nares appear normal, no drainage. Moist oral mucosa  Neck:  No restricted ROM. Trachea midline   CV:   RRR. No m/r/g. No jugular venous distension. Lungs:   CTAB. No wheezing, rhonchi, or rales. Symmetric expansion. Abdomen: Bowel sounds present. Soft, mild tender right flank and RLQ, nondistended. Extremities: No cyanosis or clubbing. No edema  Skin:     No rashes and normal coloration. Warm and dry. Neuro:  CN II-XII grossly intact. Sensation intact. A&Ox3  Psych:  Normal mood and affect.       I have reviewed ordered lab tests and independently visualized imaging below:    Last 24hr Labs:  Recent Results (from the past 24 hour(s))   CBC with Diff    Collection Time: 07/07/22  7:45 PM   Result Value Ref Range    WBC 13.3 (H) 4.3 - 11.1 K/uL    RBC 4.70 4.23 - 5.6 M/uL    Hemoglobin 14.5 13.6 - 17.2 g/dL    Hematocrit 41.6 41.1 - 50.3 %    MCV 88.5 79.6 - 97.8 FL    MCH 30.9 26.1 - 32.9 PG    MCHC 34.9 31.4 - 35.0 g/dL    RDW 13.3 11.9 - 14.6 %    Platelets 024 914 - 743 K/uL    MPV 10.0 9.4 - 12.3 FL    nRBC 0.00 0.0 - 0.2 K/uL    Differential Type AUTOMATED      Seg Neutrophils 82 (H) 43 - 78 %    Lymphocytes 8 (L) 13 - 44 %    Monocytes 10 4.0 - 12.0 %    Eosinophils % 0 (L) 0.5 - 7.8 %    Basophils 0 0.0 - 2.0 %    Immature Granulocytes 0 0.0 - 5.0 %    Segs Absolute 10.8 (H) 1.7 - 8.2 K/UL    Absolute Lymph # 1.0 0.5 - 4.6 K/UL    Absolute Mono # 1.3 0.1 - 1.3 K/UL    Absolute Eos # 0.0 0.0 - 0.8 K/UL    Basophils Absolute 0.0 0.0 - 0.2 Additional Contrast? None    Result Date: 7/7/2022  Clinical history: Right lower abdominal pain and dark tarry stool. Right flank pain. TECHNIQUE: Axial, coronal and sagittal CT of the abdomen/pelvis without IV contrast. Radiation dose reduction techniques were used for this study:  Our CT scanners use one or all of the following: Automated exposure control, adjustment of the mA and/or kVp according to patient's size, iterative reconstruction. COMPARISON: CT September 2015 and CT August 2019. FINDINGS: Visualized lung visualized lung bases are unremarkable. Abdomen findings: Absence of IV contrast limits sensitivity. There is a large soft tissue mass in the upper pole of the right kidney, measuring up to 8.5 x 9.5 cm. These measured about 7.0 x 7.0 cm on August 2019 CT and 4.0 x 5.3 cm on September 2015 study. There is mass effect on the adjacent right hepatic lobe. There is mild right hydronephrosis, due to a 2-3 mm calculus at the right ureterovesical junction. Few tiny left renal calculi. No left hydronephrosis. There are small left renal parapelvic cysts. Few small cortical cysts in the left kidney. Gallbladder is contracted. There is no peripancreatic fluid collection. The spleen and the liver are not enlarged. No evidence of lymphadenopathy. Unenhanced adrenal glands are unremarkable. Abdominal aorta is normal in course and caliber with prominent atherosclerotic calcifications. Stomach is normal in contour. Small bowel loops are normal in caliber. No small bowel obstruction. Pelvic findings: There is a large calculus within the urinary bladder, measuring up to 2.5 cm. Prostate gland is enlarged, exerting mass effect on the urinary bladder. There is large 7 cm fat-containing left inguinal hernia. Smaller 3.5 cm fat-containing right inguinal hernia. No bowel herniation. The hernias are also noted on the prior exam. There is sigmoid diverticulosis without diverticulitis.  The colon is normal in course and caliber. No evidence of appendicitis. There is no free air or free fluid. There are degenerative changes of the spine. 1. Mild right hydronephrosis, due to a 2-3 mm calculus at the right ureterovesical junction. 2. A 2.5 cm calculus within the urinary bladder, similar to 2019 CT. 3. Large soft tissue mass at the upper pole of the right kidney has increased in size and now measures about 9.5 cm. This mass measured 7 cm on the August 2019 study and 5.3 cm on September 2015 study. Urology referral recommended. 4. Enlarged prostate gland. 5. Large fat-containing left inguinal hernia and small fat-containing right inguinal hernia, appearing grossly similar to prior exam. No bowel herniation. 6. No evidence of colitis, diverticulitis or bowel obstruction. DC4        Echocardiogram:  No results found for this or any previous visit.       Meds previously ordered:  Orders Placed This Encounter   Medications    sodium chloride flush 0.9 % injection 5-40 mL    sodium chloride flush 0.9 % injection 5-40 mL    0.9 % sodium chloride infusion    OR Linked Order Group     ondansetron (ZOFRAN-ODT) disintegrating tablet 4 mg     ondansetron (ZOFRAN) injection 4 mg    polyethylene glycol (GLYCOLAX) packet 17 g    aluminum & magnesium hydroxide-simethicone (MAALOX) 200-200-20 MG/5ML suspension 30 mL    OR Linked Order Group     acetaminophen (TYLENOL) tablet 650 mg     acetaminophen (TYLENOL) suppository 650 mg    enoxaparin (LOVENOX) injection 40 mg     Order Specific Question:   Indication of Use     Answer:   Prophylaxis-DVT/PE    cefTRIAXone (ROCEPHIN) 1000 mg IVPB in NS 50ml minibag     Order Specific Question:   Antimicrobial Indications     Answer:   Urinary Tract Infection     Order Specific Question:   UTI duration of therapy     Answer:   5 days           Signed:  Aleksandr Ac MD

## 2022-07-08 NOTE — ED PROVIDER NOTES
[employer] Emergency Department Provider Note                   PCP:                None Provider               Age: 80 y.o. Sex: male       ICD-10-CM    1. TAMMY (acute kidney injury) ([facility] [address]  N17.9    2. Leukocytosis, unspecified type  D72.829    3. Right flank pain  R10.9        DISPOSITION          MDM  Number of Diagnoses or Management Options  TAMMY (acute kidney injury) ([facility] [address]): new, needed workup  Leukocytosis, unspecified type: new, needed workup  Right flank pain: new, needed workup  Right kidney mass: new, needed workup  Right ureteral stone: new, needed workup  Diagnosis management comments: Patient with TAMMY, leukocytosis. UA with evidence of UTI. Rocephin 1 g IV ordered. Blood cultures obtained. CT urogram with evidence of ureteral stone as well as enlarging right renal mass. Patient states that he does not want the renal mass to be intervened on. Urology consulted. Patient with admission with IV fluid hydration, IV antibiotics and potential intervention in regards to his renal stone. Urology consulted. Dr. Russell Biggs requests patient be transferred DT and that Hospitalist DT admit and will follow.         Amount and/or Complexity of Data Reviewed  Clinical lab tests: ordered and reviewed  Tests in the radiology section of CPT®: ordered and reviewed  Tests in the medicine section of CPT®: ordered and reviewed  Review and summarize past medical records: yes  Discuss the patient with other providers: yes  Independent visualization of images, tracings, or specimens: yes    Risk of Complications, Morbidity, and/or Mortality  Presenting problems: high  Diagnostic procedures: moderate  Management options: moderate    Patient Progress  Patient progress: stable       Orders Placed This Encounter   Procedures    Culture, Urine    CT ABDOMEN PELVIS RENAL STONE Additional Contrast? None    CBC with Diff    CMP    Lipase    Lactic Acid (Select if patient is over 65 to rule out mesenteric ischemia)    Urinalysis w rflx microscopic    Diet NPO    POCT Urine Dipstick    Saline lock IV        Angela Otoole is a 80 y.o. male who presents to the Emergency Department with chief complaint of  R flank pain. Chief Complaint   Patient presents with    Abdominal Pain      82-year-old male with history of pancreatitis presents with complaint of worsening right flank pain with radiation to right lower quadrant that is been present for the past week. Reports nausea, decreased oral intake. Denies fever, chills, diarrhea, constipation. Patient does report dark appearing stools over the past several days. Denies being on any anticoagulation. Patient denies taking any medications. States that in 2019 he was seen in emergency MD and informed that he had a kidney stone. Reports decreased urine output. Denies any alleviating or exacerbating factors. Denies any recent trauma or injury to left flank. Denies chest pain, shortness of breath, cough, hemoptysis. Reports history of laparoscopic cholecystectomy. Patient also with history of renal mass that he is previously been followed by urology for in the past.  Patient declined partial nephrectomy and further work-up in regards to this. Patient rates symptoms as moderate to severe. The history is provided by the patient. No  was used. Abdominal Pain  Pain location:  R flank  Pain quality: sharp and shooting    Pain severity:  Moderate  Onset quality:  Gradual  Timing:  Constant  Progression:  Worsening  Chronicity:  Recurrent  Relieved by:  Nothing  Associated symptoms: nausea and vomiting    Associated symptoms: no anorexia, no chest pain, no chills, no constipation, no cough, no diarrhea, no fatigue, no fever, no hematemesis, no hematochezia, no hematuria, no shortness of breath and no sore throat          Review of Systems   Constitutional: Negative for chills, diaphoresis, fatigue and fever.    HENT: Negative for congestion, rhinorrhea and sore throat. Respiratory: Negative for cough and shortness of breath. Cardiovascular: Negative for chest pain and palpitations. Gastrointestinal: Positive for abdominal pain, nausea and vomiting. Negative for anal bleeding, anorexia, constipation, diarrhea, hematemesis and hematochezia. Genitourinary: Positive for flank pain. Negative for hematuria, penile swelling, scrotal swelling and testicular pain. Musculoskeletal: Negative for arthralgias, back pain, neck pain and neck stiffness. Skin: Negative for rash and wound. Neurological: Negative for dizziness, syncope, weakness, light-headedness and headaches. Hematological: Does not bruise/bleed easily. Past Medical History:   Diagnosis Date    Kidney stone         Past Surgical History:   Procedure Laterality Date    HEENT      nose    LAP,CHOLECYSTECTOMY          Family History   Problem Relation Age of Onset    Cancer Mother            Social Connections:     Frequency of Communication with Friends and Family: Not on file    Frequency of Social Gatherings with Friends and Family: Not on file    Attends [employer] Services: Not on file    Active Member of Clubs or Organizations: Not on file    Attends Club or Organization Meetings: Not on file    Marital Status: Not on file        Allergies   Allergen Reactions    Penicillins Other (See Comments)        Vitals signs and nursing note reviewed. Patient Vitals for the past 4 hrs:   Temp Pulse Resp BP   07/07/22 1927 98.6 °F (37 °C) 93 20 (!) 144/89          Physical Exam  Vitals and nursing note reviewed. Exam conducted with a chaperone present. Constitutional:       Appearance: He is well-developed. HENT:      Head: Normocephalic. Mouth/Throat:      Mouth: Mucous membranes are moist.   Eyes:      Extraocular Movements: Extraocular movements intact. Pupils: Pupils are equal, round, and reactive to light. Cardiovascular:      Rate and Rhythm: Normal rate. Heart sounds: Normal heart sounds. Pulmonary:      Effort: Pulmonary effort is normal.      Breath sounds: Normal breath sounds. Comments: CTAB. Abdominal:      General: Abdomen is flat. Bowel sounds are normal.      Palpations: Abdomen is soft. Tenderness: There is right CVA tenderness. There is no left CVA tenderness, guarding or rebound. Negative signs include Cox's sign and McBurney's sign. Comments: Soft. Mild right CVA tenderness. No pulsatile mass noted. No rebound or guarding. Genitourinary:     Comments: Rectal exam with external hemorrhoids noted. No thrombosed hemorrhoids present. Light brown stool. Hemoccult negative. No bright red blood per rectum. No fissures noted. Skin:     General: Skin is warm. Coloration: Skin is not pale. Findings: No rash. Comments: No dermatomal rash. Neurological:      General: No focal deficit present. Mental Status: He is alert and oriented to person, place, and time. Motor: No weakness.           Procedures      Labs Reviewed   CBC WITH AUTO DIFFERENTIAL - Abnormal; Notable for the following components:       Result Value    WBC 13.3 (*)     Seg Neutrophils 82 (*)     Lymphocytes 8 (*)     Eosinophils % 0 (*)     Segs Absolute 10.8 (*)     All other components within normal limits   COMPREHENSIVE METABOLIC PANEL - Abnormal; Notable for the following components:    Sodium 135 (*)     Glucose 110 (*)     BUN 27 (*)     CREATININE 2.14 (*)     GFR  39 (*)     GFR Non- 32 (*)     Total Bilirubin 2.3 (*)     Globulin 4.1 (*)     Albumin/Globulin Ratio 1.0 (*)     All other components within normal limits   LIPASE - Abnormal; Notable for the following components:    Lipase 68 (*)     All other components within normal limits   LACTIC ACID - Abnormal; Notable for the following components:    Lactic Acid, Plasma <0.1 (*)     All other components within normal limits   CULTURE, URINE URINALYSIS        CT ABDOMEN PELVIS RENAL STONE Additional Contrast? None    (Results Pending)                    ED Course as of 07/07/22 2237   Thu Jul 07, 2022 2048 Creatinine(!): 2.14 [DF]   2054 WBC(!): 13.3 [DF]   2054 BUN,BUNPL(!): 27 [DF]   2054 GLUCOSE, FASTING,GF(!): 110 [DF]   2129 Lactic Acid, Plasma(!): <0.1 [DF]   2224 CT RENAL STONE IMPRESSION:     1. Mild right hydronephrosis, due to a 2-3 mm calculus at the right  ureterovesical junction.     2. A 2.5 cm calculus within the urinary bladder, similar to 2019 CT.     3. Large soft tissue mass at the upper pole of the right kidney has increased in  size and now measures about 9.5 cm. This mass measured 7 cm on the August 2019  study and 5.3 cm on September 2015 study. Urology referral recommended.     4. Enlarged prostate gland.     5. Large fat-containing left inguinal hernia and small fat-containing right  inguinal hernia, appearing grossly similar to prior exam. No bowel herniation.     6. No evidence of colitis, diverticulitis or bowel obstruction. [DF]      ED Course User Index  [DF] Alan Madera MD        Voice dictation software was used during the making of this note. This software is not perfect and grammatical and other typographical errors may be present. This note has not been completely proofread for errors.      Brayan Sewell MD  07/07/22 2241       Alan Madera MD  07/07/22 2242

## 2022-07-08 NOTE — TELEPHONE ENCOUNTER
Surgery Date: 7/8    Pre/assessment date: inpatient     BT/ASA: none    Surgery Scheduler: Mary Cardoso

## 2022-07-08 NOTE — ANESTHESIA POSTPROCEDURE EVALUATION
Department of Anesthesiology  Postprocedure Note    Patient: Kristyn Flaherty  MRN: [id number]  YOB: 1940  Date of evaluation: 7/8/2022      Procedure Summary     Date: 07/08/22 Room / Location: [facility] MAIN OR 01 CYSTO / [facility] MAIN OR    Anesthesia Start: 0689 Anesthesia Stop: 1649    Procedure: CYSTOSCOPY (Right ) Diagnosis:       Esaw Acre, kidney      Garjose Abdi, kidney [N20.0])    Providers:  Marcello Davis MD Responsible Provider: Calixto Haji MD    Anesthesia Type: General ASA Status: 2          Anesthesia Type: General    Clare Phase I: Clare Score: 10    Clare Phase II:        Anesthesia Post Evaluation    Patient location during evaluation: PACU  Patient participation: complete - patient participated  Level of consciousness: awake and alert  Airway patency: patent  Nausea & Vomiting: no nausea  Cardiovascular status: hemodynamically stable  Respiratory status: acceptable  Hydration status: euvolemic

## 2022-07-08 NOTE — ANESTHESIA PRE PROCEDURE
Department of Anesthesiology  Preprocedure Note       Name:  Michaelle Ellis   Age:  80 y.o.  :  1940                                          MRN:  [id number]         Date:  2022      Surgeon: Satinder Boothe):  Andreia Brizuela MD    Procedure: Procedure(s):  CYSTOSCOPY/ URETERASCOPY/RIGHT  URETERAL STENT Benld Bellow, LITHO    Medications prior to admission:   Prior to Admission medications    Not on File       Current medications:    Current Facility-Administered Medications   Medication Dose Route Frequency Provider Last Rate Last Admin    sodium chloride flush 0.9 % injection 5-40 mL  5-40 mL IntraVENous 2 times per day Bernardo Young MD   10 mL at 22 1109    sodium chloride flush 0.9 % injection 5-40 mL  5-40 mL IntraVENous PRN Bernardo Young MD        0.9 % sodium chloride infusion   IntraVENous PRN Bernardo Young MD        ondansetron (ZOFRAN-ODT) disintegrating tablet 4 mg  4 mg Oral Q8H PRN Bernardo Young MD        Or    ondansetron [facility] injection 4 mg  4 mg IntraVENous Q6H PRN Bernardo Young MD        polyethylene glycol [facility]) packet 17 g  17 g Oral Daily PRN Bernardo Young MD        aluminum & magnesium hydroxide-simethicone (MAALOX) 583-546-87 MG/5ML suspension 30 mL  30 mL Oral Q6H PRN Bernardo Young MD        acetaminophen (TYLENOL) tablet 650 mg  650 mg Oral Q6H PRN Bernardo Young MD        Or    acetaminophen (TYLENOL) suppository 650 mg  650 mg Rectal Q6H PRN Bernardo Young MD        enoxaparin (LOVENOX) injection 40 mg  40 mg SubCUTAneous Daily Bernardo Young MD        cefTRIAXone (ROCEPHIN) 1000 mg IVPB in NS 50ml minibag  1,000 mg IntraVENous Q24H Bernardo Young MD        melatonin tablet 6 mg  6 mg Oral Nightly PRN Bernardo Young MD        HYDROcodone-acetaminophen [facility])  MG per tablet 1 tablet  1 tablet Oral Q6H PRN Madi Holt MD   1 tablet at 22 1429    morphine injection 2 mg  2 mg IntraVENous Q6H PRN Madi Holt MD   2 mg at 22 1101 Allergies: Allergies   Allergen Reactions    Penicillins Other (See Comments)       Problem List:    Patient Active Problem List   Diagnosis Code    Acute pancreatitis K85.90    Renal cell cancer, right ([facility] [address] C64.1    Acute kidney injury ([facility] [address] N17.9    UTI (urinary tract infection) N39.0    Renal mass N28.89    Hydronephrosis N13.30    Ureteral stone N20.1       Past Medical History:        Diagnosis Date    Kidney stone        Past Surgical History:        Procedure Laterality Date    HEENT      nose    LAP,CHOLECYSTECTOMY         Social History:    Social History     Tobacco Use    Smoking status: Former Smoker    Smokeless tobacco: Not on file   Substance Use Topics    Alcohol use: No                                Counseling given: Not Answered      Vital Signs (Current):   Vitals:    07/08/22 0734 07/08/22 1126 07/08/22 1415 07/08/22 1500   BP: 130/83 (!) 152/89  (!) 157/76   Pulse: 67 66  78   Resp: 17 17  16   Temp: 98.2 °F (36.8 °C) 98.1 °F (36.7 °C)  (!) 100.7 °F (38.2 °C)   TempSrc: Oral Oral  Oral   SpO2: 95% 99%  95%   Weight:   190 lb (86.2 kg)    Height:   6' (1.829 m)                                               BP Readings from Last 3 Encounters:   07/08/22 (!) 157/76   07/08/22 (!) 171/93       NPO Status: Time of last liquid consumption: 0000                        Time of last solid consumption: 0000                        Date of last liquid consumption: 07/07/22                        Date of last solid food consumption: 07/07/22    BMI:   Wt Readings from Last 3 Encounters:   07/08/22 190 lb (86.2 kg)   07/07/22 190 lb (86.2 kg)     Body mass index is 25.77 kg/m².     CBC:   Lab Results   Component Value Date/Time    WBC 13.3 07/07/2022 07:45 PM    RBC 4.70 07/07/2022 07:45 PM    HGB 14.5 07/07/2022 07:45 PM    HCT 41.6 07/07/2022 07:45 PM    MCV 88.5 07/07/2022 07:45 PM    RDW 13.3 07/07/2022 07:45 PM     07/07/2022 07:45 PM       CMP:   Lab Results   Component Value Date/Time     07/07/2022 07:45 PM    K 3.5 07/07/2022 07:45 PM     07/07/2022 07:45 PM    CO2 24 07/07/2022 07:45 PM    BUN 27 07/07/2022 07:45 PM    CREATININE 2.14 07/07/2022 07:45 PM    GFRAA 39 07/07/2022 07:45 PM    AGRATIO 1.0 08/29/2019 02:09 AM    LABGLOM 32 07/07/2022 07:45 PM    GLUCOSE 110 07/07/2022 07:45 PM    PROT 8.0 07/07/2022 07:45 PM    CALCIUM 9.3 07/07/2022 07:45 PM    BILITOT 2.3 07/07/2022 07:45 PM    ALKPHOS 56 07/07/2022 07:45 PM    ALKPHOS 77 08/29/2019 02:09 AM    AST 29 07/07/2022 07:45 PM    ALT 16 07/07/2022 07:45 PM       POC Tests: No results for input(s): POCGLU, POCNA, POCK, POCCL, POCBUN, POCHEMO, POCHCT in the last 72 hours. Coags: No results found for: PROTIME, INR, APTT    HCG (If Applicable): No results found for: PREGTESTUR, PREGSERUM, HCG, HCGQUANT     ABGs: No results found for: PHART, PO2ART, BKQ6FHL, VQY6DEW, BEART, K1HFFOPK     Type & Screen (If Applicable):  No results found for: LABABO, LABRH    Drug/Infectious Status (If Applicable):  No results found for: HIV, HEPCAB    COVID-19 Screening (If Applicable): No results found for: COVID19        Anesthesia Evaluation  Patient summary reviewed and Nursing notes reviewed no history of anesthetic complications:   Airway: Mallampati: II  TM distance: >3 FB   Neck ROM: full     Dental: normal exam   (+) poor dentition      Pulmonary: breath sounds clear to auscultation                             Cardiovascular:  Exercise tolerance: poor (<4 METS),             Rate: normal                    Neuro/Psych:   Negative Neuro/Psych ROS              GI/Hepatic/Renal:            ROS comment: Hx pancreatitis. Endo/Other: Negative Endo/Other ROS   (+) malignancy/cancer (renal). Abdominal:             Vascular: negative vascular ROS. Other Findings:           Anesthesia Plan      general     ASA 2       Induction: intravenous.       Anesthetic plan and risks discussed with patient.                         Britt Del Rio MD   7/8/2022

## 2022-07-08 NOTE — TELEPHONE ENCOUNTER
----- Message from Jennifer Naylor MD sent at 7/8/2022  8:03 AM EDT -----  Cystoscopy, right ureteral stent, possible right ureteroscopy and laser lithotripsy of right ureteral calculus.   For today with Mat Carr

## 2022-07-08 NOTE — PROGRESS NOTES
Cher Laughlin is a 80 y.o. male with medical history of pancreatitis and known renal mass admitted this morning with uti, hydronephrosis and ureteral stone. Urology consulted and plan for cystoscopy, right stent and possible right ureteroscopy and laser lithotripsy today. N.p.o. for now. Will continue current management.

## 2022-07-08 NOTE — ACP (ADVANCE CARE PLANNING)
[facility] Hospitalist Service  At the heart of better care     Advance Care Planning   Admit Date:  2022  2:13 AM   Name:  Seble Chatman   Age:  80 y.o. Sex:  male  :  1940   MRN:  [id number]   Room:  [address] is able to make his own decisions:   YES    If pt unable to make decisions, POA/surrogate decision maker:  cousin    Other members present in the meeting:   NONE    Patient / surrogate decision-maker directed:  cousin    Other ACP topics discussed, if applicable:   Discussed code status, and he wishes to be full code at this time    Patient or surrogate consented to discussion of the current conditions, workup, management plans, prognosis, and understand the risk for further deterioration. Time spent: 12 minutes in direct discussion (face to face and/or over phone).       Signed:  Charisma Lema MD

## 2022-07-09 VITALS
HEART RATE: 60 BPM | SYSTOLIC BLOOD PRESSURE: 143 MMHG | HEIGHT: 72 IN | WEIGHT: 190 LBS | BODY MASS INDEX: 25.73 KG/M2 | OXYGEN SATURATION: 98 % | DIASTOLIC BLOOD PRESSURE: 84 MMHG | RESPIRATION RATE: 18 BRPM | TEMPERATURE: 98.4 F

## 2022-07-09 LAB
ANION GAP SERPL CALC-SCNC: 5 MMOL/L (ref 7–16)
BASOPHILS # BLD: 0 K/UL (ref 0–0.2)
BASOPHILS NFR BLD: 0 % (ref 0–2)
BUN SERPL-MCNC: 28 MG/DL (ref 8–23)
CALCIUM SERPL-MCNC: 9.2 MG/DL (ref 8.3–10.4)
CHLORIDE SERPL-SCNC: 107 MMOL/L (ref 98–107)
CO2 SERPL-SCNC: 24 MMOL/L (ref 21–32)
CREAT SERPL-MCNC: 1.7 MG/DL (ref 0.8–1.5)
DIFFERENTIAL METHOD BLD: ABNORMAL
EOSINOPHIL # BLD: 0 K/UL (ref 0–0.8)
EOSINOPHIL NFR BLD: 0 % (ref 0.5–7.8)
ERYTHROCYTE [DISTWIDTH] IN BLOOD BY AUTOMATED COUNT: 13.3 % (ref 11.9–14.6)
GLUCOSE SERPL-MCNC: 167 MG/DL (ref 65–100)
HCT VFR BLD AUTO: 36.5 % (ref 41.1–50.3)
HGB BLD-MCNC: 12.2 G/DL (ref 13.6–17.2)
IMM GRANULOCYTES # BLD AUTO: 0 K/UL (ref 0–0.5)
IMM GRANULOCYTES NFR BLD AUTO: 0 % (ref 0–5)
LYMPHOCYTES # BLD: 0.4 K/UL (ref 0.5–4.6)
LYMPHOCYTES NFR BLD: 4 % (ref 13–44)
MCH RBC QN AUTO: 30.1 PG (ref 26.1–32.9)
MCHC RBC AUTO-ENTMCNC: 33.4 G/DL (ref 31.4–35)
MCV RBC AUTO: 90.1 FL (ref 79.6–97.8)
MONOCYTES # BLD: 0.4 K/UL (ref 0.1–1.3)
MONOCYTES NFR BLD: 5 % (ref 4–12)
NEUTS SEG # BLD: 8.1 K/UL (ref 1.7–8.2)
NEUTS SEG NFR BLD: 91 % (ref 43–78)
NRBC # BLD: 0 K/UL (ref 0–0.2)
PLATELET # BLD AUTO: 192 K/UL (ref 150–450)
PMV BLD AUTO: 10.5 FL (ref 9.4–12.3)
POTASSIUM SERPL-SCNC: 4.2 MMOL/L (ref 3.5–5.1)
RBC # BLD AUTO: 4.05 M/UL (ref 4.23–5.6)
SODIUM SERPL-SCNC: 136 MMOL/L (ref 138–145)
WBC # BLD AUTO: 8.9 K/UL (ref 4.3–11.1)

## 2022-07-09 PROCEDURE — 2580000003 HC RX 258: Performed by: ANESTHESIOLOGY

## 2022-07-09 PROCEDURE — 36415 COLL VENOUS BLD VENIPUNCTURE: CPT

## 2022-07-09 PROCEDURE — 85025 COMPLETE CBC W/AUTO DIFF WBC: CPT

## 2022-07-09 PROCEDURE — 80048 BASIC METABOLIC PNL TOTAL CA: CPT

## 2022-07-09 RX ORDER — CEPHALEXIN 500 MG/1
500 CAPSULE ORAL 4 TIMES DAILY
Qty: 20 CAPSULE | Refills: 0 | Status: SHIPPED | OUTPATIENT
Start: 2022-07-09 | End: 2022-07-14

## 2022-07-09 RX ADMIN — SODIUM CHLORIDE, POTASSIUM CHLORIDE, SODIUM LACTATE AND CALCIUM CHLORIDE: 600; 310; 30; 20 INJECTION, SOLUTION INTRAVENOUS at 03:08

## 2022-07-09 ASSESSMENT — PAIN SCALES - GENERAL: PAINLEVEL_OUTOF10: 0

## 2022-07-09 NOTE — PLAN OF CARE
Problem: Discharge Planning  Goal: Discharge to home or other facility with appropriate resources  7/9/2022 1114 by Wil Rios RN  Outcome: Progressing  7/9/2022 0017 by Cyndy Morse RN  Outcome: Progressing     Problem: Pain  Goal: Verbalizes/displays adequate comfort level or baseline comfort level  7/9/2022 1114 by Wil Rios RN  Outcome: Progressing  7/9/2022 0017 by Cyndy Morse RN  Outcome: Progressing     Problem: Safety - Adult  Goal: Free from fall injury  7/9/2022 1114 by Wil Rios RN  Outcome: Progressing  7/9/2022 0017 by Cyndy Morse RN  Outcome: Progressing     Problem: ABCDS Injury Assessment  Goal: Absence of physical injury  7/9/2022 1114 by Wil Rios RN  Outcome: Progressing  7/9/2022 0017 by Cyndy Morse RN  Outcome: Progressing

## 2022-07-09 NOTE — PROGRESS NOTES
Admit Date: 7/8/2022    Subjective:     Patient has no new complaint. NO R flank pain. Voiding well. Objective:     Patient Vitals for the past 8 hrs:   BP Temp Temp src Pulse Resp SpO2   07/09/22 0745 128/83 97.6 °F (36.4 °C) Oral 65 20 96 %   07/09/22 0438 125/80 98.6 °F (37 °C) Oral 57 20 96 %     07/09 0701 - 07/09 1900  In: 360 [P.O.:360]  Out: 500 [Urine:500]  07/07 1901 - 07/09 0700  In: 873 [P.O.:222;  I.V.:500]  Out: 650 [Urine:250]    Physical Exam: benign exam        Data Review   Recent Results (from the past 24 hour(s))   Basic Metabolic Panel w/ Reflex to MG    Collection Time: 07/09/22  5:31 AM   Result Value Ref Range    Sodium 136 (L) 138 - 145 mmol/L    Potassium 4.2 3.5 - 5.1 mmol/L    Chloride 107 98 - 107 mmol/L    CO2 24 21 - 32 mmol/L    Anion Gap 5 (L) 7 - 16 mmol/L    Glucose 167 (H) 65 - 100 mg/dL    BUN 28 (H) 8 - 23 MG/DL    CREATININE 1.70 (H) 0.8 - 1.5 MG/DL    GFR African American 50 (L) >60 ml/min/1.73m2    GFR Non- 42 (L) >60 ml/min/1.73m2    Calcium 9.2 8.3 - 10.4 MG/DL   CBC with Auto Differential    Collection Time: 07/09/22  5:31 AM   Result Value Ref Range    WBC 8.9 4.3 - 11.1 K/uL    RBC 4.05 (L) 4.23 - 5.6 M/uL    Hemoglobin 12.2 (L) 13.6 - 17.2 g/dL    Hematocrit 36.5 (L) 41.1 - 50.3 %    MCV 90.1 79.6 - 97.8 FL    MCH 30.1 26.1 - 32.9 PG    MCHC 33.4 31.4 - 35.0 g/dL    RDW 13.3 11.9 - 14.6 %    Platelets 292 838 - 450 K/uL    MPV 10.5 9.4 - 12.3 FL    nRBC 0.00 0.0 - 0.2 K/uL    Differential Type AUTOMATED      Seg Neutrophils 91 (H) 43 - 78 %    Lymphocytes 4 (L) 13 - 44 %    Monocytes 5 4.0 - 12.0 %    Eosinophils % 0 (L) 0.5 - 7.8 %    Basophils 0 0.0 - 2.0 %    Immature Granulocytes 0 0.0 - 5.0 %    Segs Absolute 8.1 1.7 - 8.2 K/UL    Absolute Lymph # 0.4 (L) 0.5 - 4.6 K/UL    Absolute Mono # 0.4 0.1 - 1.3 K/UL    Absolute Eos # 0.0 0.0 - 0.8 K/UL    Basophils Absolute 0.0 0.0 - 0.2 K/UL    Absolute Immature Granulocyte 0.0 0.0 - 0.5 K/UL Assessment:     Principal Problem:    UTI (urinary tract infection)  Active Problems:    Acute kidney injury ([address]    Renal mass    Hydronephrosis    Ureteral stone  Resolved Problems:    * No resolved hospital problems. *  POD 1 s/p cystoscopy. Plan:     I was unable to access his R ureter yesterday in OR. He is feeling much better and serum cr has improved to 1.7. OK for discharge from  perspective. Office will call to arrange follow up with Dr. Lubna Tyson. I have stressed to patient importance of keeping that appointment.      Everett Marin MD

## 2022-07-09 NOTE — OP NOTE
[facility]  OPERATIVE REPORT    Name:  Delicia Tripp  MR#:  [id number]  :  1940  ACCOUNT #:  [de-identified]  DATE OF SERVICE:  2022    PREOPERATIVE DIAGNOSES:  1.  2-mm right distal ureteral calculus. 2.  Large right renal mass. POSTOPERATIVE DIAGNOSES:  1.  2-mm right distal ureteral calculus. 2.  Large right renal mass. 3.  Prostatomegaly with very large median lobe making visualization of the trigone and the ureteral orifices very difficult. PROCEDURE PERFORMED:  Cystourethroscopy and unsuccessful attempt at cannulation of the right ureteral orifice. SURGEON:  Germán Galaviz MD    ASSISTANT:  None. ANESTHESIA:  General.    COMPLICATIONS:  None apparent. SPECIMENS REMOVED:  None. IMPLANTS:  None. ESTIMATED BLOOD LOSS:  Minimal.    INDICATIONS:  This is a 72-year-old gentleman who has a large right renal mass consistent with renal cell carcinoma and also a 2.5-cm bladder stone who presented with right flank pain. Imaging revealed what appeared be a very small distal right ureteral stone. Of note in the preop holding area, he said that his pain was much improved and he was not hurting. FINDINGS:  I identified what I suspect was very likely to be the right ureteral orifice although due to his prostatomegaly and edema of his trigone, I am not 100% completely sure was ureteral orifice but I believe it was and I tried multiple times to cannulate it using different wires and approaches and was unable to do so and it simply caused the bleeding at the site. PROCEDURE:  The patient was taken to operating room, placed in supine position. Anesthesia was induced via Anesthesiology Service. He was repositioned in the lithotomy position and prepped and draped in sterile surgical fashion with the genitalia in the sterile field. A 17-Slovak rigid cystoscope was introduced atraumatically. He had a long 3.5 cm prostatic urethra with large median lobe.   He had multiple trabeculations and diverticula within the bladder. I used my rigid scope to push down the median lobe and try to identify the right ureteral orifice and after several minutes, I believe I saw what was the ureteral orifice within the edematous tissue of the trigone. I tried a sensor wire on multiple attempts with and without a 5-Faroese over the top of it just to cannulate this and I do not know if it was the stone itself that did not allow passage beyond the ureteral orifice or the J-hooking of the ureter, but I could not adequately cannulate the right ureteral orifice and advance the wire. After several attempts, the right ureteral orifice began bleeding and decision was made to abort the case and not to manipulate the right ureteral orifice anymore today. The bladder was emptied. The cystoscope was removed. PLAN:  I will need to speak about this case with the patient's primary urologist for this admission, Dr. Hayley Kennedy. Before doing so, we will see how he feels in the morning in regards to pain, see what his creatinine has done. There was a chance we may perform a CT scan with delayed images to see if the stone has passed and if there is any obstruction, another option would be a right nephrostomy tube and approach the stone in an antegrade fashion although this was a very suboptimal choice considering his large renal mass on the right side and there would also be the option of potentially performing a transurethral resection of the ureteral orifice to try to reach the stone. We will determine this in the future depending upon his lab work and his clinical picture.       Talya Desir MD      [id number]  D:  07/08/2022 16:52  T:  07/09/2022 2:25  JOB #:  [id number]

## 2022-07-09 NOTE — PROGRESS NOTES
Pt alert and oriented. No complaints overnight. Voiding with ease. No stone see overnight in the strainer . Vitals stable. No c/o ;pain. Will continue to monitor and follow POC.

## 2022-07-09 NOTE — DISCHARGE SUMMARY
Hospitalist Discharge Summary   Admit Date:  2022  2:13 AM   DC Note date: 2022  Name:  Clifford Figueroa   Age:  80 y.o. Sex:  male  :  1940   MRN:  [id number]   Room:  [facility]  PCP:  None Provider    Presenting Complaint: No chief complaint on file. Initial Admission Diagnosis: UTI (urinary tract infection) [N39.0]     Problem List for this Hospitalization (present on admission):    Principal Problem:    UTI (urinary tract infection)  Active Problems:    Acute kidney injury ([address]    Renal mass    Hydronephrosis    Ureteral stone  Resolved Problems:    * No resolved hospital problems. *    Did Patient have Sepsis (YES OR NO): no    Hospital Course:  Anthony Ching a 80 y. o. male with medical history of pancreatitis and known renal mass, but otherwise healthy without any chronic medical issues,  Presents to E with complaint of worsening right flank pain with radiation to right lower quadrant that is been present for the past week.  Reports nausea, decreased oral intake and reduced UOP. Pain radiated moderate to severe.  Denies fever, chills, diarrhea, constipation.  Patient does report dark appearing stools over the past several days.  Denies being on any anticoagulation.  Patient denies taking any medications.       States that in 2019 he was seen in emergency MD and informed that he had a kidney stone.  Reports history of laparoscopic cholecystectomy.  Patient also with history of renal mass that he is previously been followed by urology for in the past. Frederic Silverio declined partial nephrectomy and further work-up in regards to this. Found in ED, with TAMMY, leukocytosis, UA c/w UTI.  CT urogram with evidence of ureteral stone as well as enlarging right renal mass.  Patient states that he does not want the renal mass to be intervened on. Urology consulted.  Patient admitted with IV fluid hydration, IV rocephin and evaluated by urology.  Underwent Cystourethroscopy and unsuccessful attempt at cannulation of the right ureteral orifice. Symptoms improved despite this. Stable for DC home from a urologic perspective. Renal function improved with supportive care with Scr 2.14 to 1.7 on discharge. He rec'd 2 doses rocephin and discharged with keflex x 5 days pending final culture results. Given RX for repeat BMP in 1 week. He will follow up with outpatient urology. Office will call and arrange FU with . OP note 7/8   PREOPERATIVE DIAGNOSES:  1.  2-mm right distal ureteral calculus. 2.  Large right renal mass.     POSTOPERATIVE DIAGNOSES:  1.  2-mm right distal ureteral calculus. 2.  Large right renal mass. 3.  Prostatomegaly with very large median lobe making visualization of the trigone and the ureteral orifices very difficult.     PROCEDURE PERFORMED:  Cystourethroscopy and unsuccessful attempt at cannulation of the right ureteral orifice. Symptoms resolved. Stable for discharge with OP follow up with Dr. Grand portillo. No PCP. Given RX for keflex x 5 days and BMP in 1 week for TAMMY. Disposition: home  Diet: ADULT DIET; Regular  Code Status: Full Code    Follow Ups:   Baltazar Watts MD In 1 week. Specialty: Urology  Why: kidney stone   Contact information:  Gina [address] [place] [address]  [phone]                       Follow up labs/diagnostics (ultimately defer to outpatient provider):  BMP, blood and urine cultures       Time spent in patient discharge and coordination 34 minutes. Plan was discussed with patient. All questions answered. Patient was stable at time of discharge. Instructions given to call a physician or return if any concerns.     Current Discharge Medication List      START taking these medications    Details   cephALEXin (KEFLEX) 500 MG capsule Take 1 capsule by mouth 4 times daily for 5 days  Qty: 20 capsule, Refills: 0             Procedures done this admission:  Procedure(s):  CYSTOSCOPY    Consults this admission:  IP CONSULT TO UROLOGY    Echocardiogram results:  No results found for this or any previous visit. Diagnostic Imaging/Tests:   No results found.       Labs: Results:       BMP, Mg, Phos Recent Labs     07/07/22 1945 07/09/22  0531   * 136*   K 3.5 4.2    107   CO2 24 24   ANIONGAP 9 5*   BUN 27* 28*   CREATININE 2.14* 1.70*   LABGLOM 32* 42*   GFRAA 39* 50*   CALCIUM 9.3 9.2   GLUCOSE 110* 167*      CBC Recent Labs     07/07/22 1945 07/09/22  0531   WBC 13.3* 8.9   RBC 4.70 4.05*   HGB 14.5 12.2*   HCT 41.6 36.5*   MCV 88.5 90.1   MCH 30.9 30.1   MCHC 34.9 33.4   RDW 13.3 13.3    192   MPV 10.0 10.5   NRBC 0.00 0.00   SEGS 82* 91*   LYMPHOPCT 8* 4*   EOSRELPCT 0* 0*   MONOPCT 10 5   BASOPCT 0 0   IMMGRAN 0 0   SEGSABS 10.8* 8.1   LYMPHSABS 1.0 0.4*   EOSABS 0.0 0.0   MONOSABS 1.3 0.4   BASOSABS 0.0 0.0   ABSIMMGRAN 0.1 0.0      LFT Recent Labs     07/07/22 1945   BILITOT 2.3*   ALKPHOS 56   AST 29   ALT 16   PROT 8.0   LABALBU 3.9   GLOB 4.1*      Cardiac  No results found for: NTPROBNP, TROPHS   Coags No results found for: PROTIME, INR, APTT   A1c No results found for: LABA1C, EAG   Lipids No results found for: CHOL, LDLCALC, LABVLDL, HDL, CHOLHDLRATIO, TRIG   Thyroid  No results found for: Damion Baird     Most Recent UA Lab Results   Component Value Date/Time    COLORU YELLOW/STRAW 07/07/2022 09:25 PM    APPEARANCE TURBID 07/07/2022 09:25 PM    SPECGRAV 1.020 07/07/2022 09:25 PM    LABPH 6.5 07/07/2022 09:25 PM    PROTEINU 30 07/07/2022 09:25 PM    GLUCOSEU Negative 07/07/2022 09:25 PM    KETUA 15 07/07/2022 09:25 PM    BILIRUBINUR Negative 07/07/2022 09:25 PM    BILIRUBINUR NEGATIVE  08/28/2019 11:45 PM    BLOODU MODERATE 07/07/2022 09:25 PM    UROBILINOGEN 0.2 07/07/2022 09:25 PM    NITRU Negative 07/07/2022 09:25 PM    LEUKOCYTESUR LARGE 07/07/2022 09:25 PM    WBCUA >100 07/07/2022 09:25 PM    RBCUA 0-3 07/07/2022 09:25 PM    EPITHUA 0-3 07/07/2022 09:25 PM    BACTERIA 2+ 07/07/2022 09:25 PM    LABCAST 3-5 07/07/2022 09:25 PM          All Labs from Last 24 Hrs:  Recent Results (from the past 24 hour(s))   Basic Metabolic Panel w/ Reflex to MG    Collection Time: 07/09/22  5:31 AM   Result Value Ref Range    Sodium 136 (L) 138 - 145 mmol/L    Potassium 4.2 3.5 - 5.1 mmol/L    Chloride 107 98 - 107 mmol/L    CO2 24 21 - 32 mmol/L    Anion Gap 5 (L) 7 - 16 mmol/L    Glucose 167 (H) 65 - 100 mg/dL    BUN 28 (H) 8 - 23 MG/DL    CREATININE 1.70 (H) 0.8 - 1.5 MG/DL    GFR African American 50 (L) >60 ml/min/1.73m2    GFR Non- 42 (L) >60 ml/min/1.73m2    Calcium 9.2 8.3 - 10.4 MG/DL   CBC with Auto Differential    Collection Time: 07/09/22  5:31 AM   Result Value Ref Range    WBC 8.9 4.3 - 11.1 K/uL    RBC 4.05 (L) 4.23 - 5.6 M/uL    Hemoglobin 12.2 (L) 13.6 - 17.2 g/dL    Hematocrit 36.5 (L) 41.1 - 50.3 %    MCV 90.1 79.6 - 97.8 FL    MCH 30.1 26.1 - 32.9 PG    MCHC 33.4 31.4 - 35.0 g/dL    RDW 13.3 11.9 - 14.6 %    Platelets 512 881 - 145 K/uL    MPV 10.5 9.4 - 12.3 FL    nRBC 0.00 0.0 - 0.2 K/uL    Differential Type AUTOMATED      Seg Neutrophils 91 (H) 43 - 78 %    Lymphocytes 4 (L) 13 - 44 %    Monocytes 5 4.0 - 12.0 %    Eosinophils % 0 (L) 0.5 - 7.8 %    Basophils 0 0.0 - 2.0 %    Immature Granulocytes 0 0.0 - 5.0 %    Segs Absolute 8.1 1.7 - 8.2 K/UL    Absolute Lymph # 0.4 (L) 0.5 - 4.6 K/UL    Absolute Mono # 0.4 0.1 - 1.3 K/UL    Absolute Eos # 0.0 0.0 - 0.8 K/UL    Basophils Absolute 0.0 0.0 - 0.2 K/UL    Absolute Immature Granulocyte 0.0 0.0 - 0.5 K/UL       Allergies   Allergen Reactions    Penicillins Other (See Comments)       There is no immunization history on file for this patient.     Recent Vital Data:  Patient Vitals for the past 24 hrs:   Temp Pulse Resp BP SpO2   07/09/22 1120 98.4 °F (36.9 °C) 60 18 (!) 143/84 98 %   07/09/22 0745 97.6 °F (36.4 °C) 65 20 128/83 96 %   07/09/22 0438 98.6 °F (37 °C) 57 20 125/80 96 %   07/08/22 2224 97.7 °F (36.5 °C) 62 18 120/70 98 %   07/08/22 1931 97.9 °F (36.6 °C) 73 17 133/87 93 %   07/08/22 1719 97.5 °F (36.4 °C) 71 15 123/64 93 %   07/08/22 1714 -- 78 -- 124/62 95 %   07/08/22 1709 -- 78 -- 132/72 94 %   07/08/22 1704 -- 74 -- 119/64 97 %   07/08/22 1659 -- 76 -- 118/63 96 %   07/08/22 1654 -- 75 -- (!) 113/58 92 %   07/08/22 1649 97.4 °F (36.3 °C) 75 14 (!) 108/56 93 %   07/08/22 1500 (!) 100.7 °F (38.2 °C) 78 16 (!) 157/76 95 %       Oxygen Therapy  SpO2: 98 %  Pulse via Oximetry: 75 beats per minute  Pulse Oximeter Device Mode: Continuous  Pulse Oximeter Device Location: Left,Finger  O2 Device: None (Room air)  O2 Flow Rate (L/min): 2 L/min    Estimated body mass index is 25.77 kg/m² as calculated from the following:    Height as of this encounter: 6' (1.829 m). Weight as of this encounter: 190 lb (86.2 kg). Intake/Output Summary (Last 24 hours) at 7/9/2022 1138  Last data filed at 7/9/2022 1120  Gross per 24 hour   Intake 1082 ml   Output 1450 ml   Net -368 ml         Physical Exam:    General:    Well nourished. No overt distress  Head:  Normocephalic, atraumatic  Eyes:  Sclerae appear normal.  Pupils equally round. HENT:  Nares appear normal, no drainage. Moist mucous membranes  Neck:  No restricted ROM. Trachea midline  CV:   RRR. No m/r/g. No JVD  Lungs:   CTAB. No wheezing, rhonchi, or rales. Respirations even, unlabored  Abdomen:   Soft, nontender, nondistended. Extremities: Warm and dry. No cyanosis or clubbing. No edema. Skin:     No rashes. Normal coloration  Neuro:  CN II-XII grossly intact. Psych:  Normal mood and affect. Signed:  Amara Philippe DO, DO    Part of this note may have been written by using a voice dictation software. The note has been proof read but may still contain some grammatical/other typographical errors.

## 2022-07-10 LAB
BACTERIA SPEC CULT: NORMAL
BACTERIA SPEC CULT: NORMAL
SERVICE CMNT-IMP: NORMAL

## 2022-07-11 ENCOUNTER — TELEPHONE (OUTPATIENT)
Dept: INTERNAL MEDICINE CLINIC | Facility: CLINIC | Age: 82
End: 2022-07-11

## 2022-07-11 NOTE — TELEPHONE ENCOUNTER
Called patient to schedule TCV following discharge from [facility] 07/09/2022.   Dx Acute Kidney injury

## 2022-07-12 ENCOUNTER — TELEPHONE (OUTPATIENT)
Dept: UROLOGY | Age: 82
End: 2022-07-12

## 2022-07-12 ENCOUNTER — TELEPHONE (OUTPATIENT)
Dept: FAMILY MEDICINE CLINIC | Facility: CLINIC | Age: 82
End: 2022-07-12

## 2022-07-12 LAB
BACTERIA SPEC CULT: NORMAL
BACTERIA SPEC CULT: NORMAL
SERVICE CMNT-IMP: NORMAL
SERVICE CMNT-IMP: NORMAL

## 2022-07-12 NOTE — TELEPHONE ENCOUNTER
Scheduled pt for 2 pm Monday the 18th.  Called pt no answer, left detailed vm about appt day/time and location and that I would call back later to make sure pt can make it to appt

## 2022-07-12 NOTE — TELEPHONE ENCOUNTER
----- Message from Sergio Park MD sent at 7/11/2022  1:23 PM EDT -----  Regarding: FW: hospital follow up  Bring him in on 7-18-22, work in , needs [employer]  ----- Message -----  From: Stephanie Bean RN  Sent: 7/11/2022  10:10 AM EDT  To: Sergio Park MD, Ellen Nunn MA  Subject: FW: hospital follow up                           I'm sorry to bump this off to you. I don't see anywhere to put this patient.  ----- Message -----  From: Cailin Hammond MD  Sent: 7/9/2022   9:03 AM EDT  To: Stephanie Bean, KELI  Subject: hospital follow up                               See Selena Bautista 2-3 weeks. I stressed importance of keeping this appointment. He has a growing R renal cancer that he has been blowing off. Tell Alec if patient refuses to make appointment. Thanks.

## 2022-07-12 NOTE — TELEPHONE ENCOUNTER
----- Message from Irving Bauer RN sent at 2022 10:09 AM EDT -----  Regarding: FW: hospital follow up  I'm sorry to bump this off to you. I don't see anywhere to put this patient.  ----- Message -----  From: Rut Reveles MD  Sent: 2022   9:03 AM EDT  To: Irving Bauer RN  Subject: hospital follow up                               See Lakshmi Pineda 2-3 weeks. I stressed importance of keeping this appointment. He has a growing R renal cancer that he has been blowing off. Tell Alec if patient refuses to make appointment. Thanks.

## 2022-07-12 NOTE — TELEPHONE ENCOUNTER
LMOM with C/S information  Patient returned call to [facility] care with PCP. He requested [address]. Appt scheduled with [facility] NP 2022 @ 9:00. Patient advised to arrive 15 to 20 min early bring picture ID , insurance card and all medications to visit. Patient also given information regarding appt 2022 with Dr Albert Burgos. [address] Transitions Initial Follow Up Call    Outreach made within 2 business days of discharge: Yes    Patient: Maryan Emmanuel Patient : 1940   MRN: [id number]  Reason for Admission: UTI  Discharge Date: 22       Spoke with: patient    Discharge department/facility: [address] [place] [address] Interactive Patient Contact:  Was patient able to fill all prescriptions: Yes  Was patient instructed to bring all medications to the follow-up visit: Yes  Is patient taking all medications as directed in the discharge summary?  Yes  Does patient understand their discharge instructions: yes  Does patient have questions or concerns that need addressed prior to 7-14 day follow up office visit: no.    Scheduled appointment with PCP within 7-14 days    Follow Up  Future Appointments   Date Time Provider Marcus Leyva   2022  2:00 PM Barry Robertson MD [id number]OL[facility] CHO   2022  9:00 AM [facility], APRN - CNP PVF G[facility] AMB Arlette Fleischer, MA

## 2022-07-18 ENCOUNTER — OFFICE VISIT (OUTPATIENT)
Dept: UROLOGY | Age: 82
End: 2022-07-18
Payer: MEDICARE

## 2022-07-18 DIAGNOSIS — N20.1 URETERAL STONE: ICD-10-CM

## 2022-07-18 DIAGNOSIS — N28.89 RENAL MASS: ICD-10-CM

## 2022-07-18 DIAGNOSIS — N17.9 ACUTE KIDNEY INJURY (HCC): ICD-10-CM

## 2022-07-18 DIAGNOSIS — N21.0 BLADDER STONE: ICD-10-CM

## 2022-07-18 DIAGNOSIS — N17.9 ACUTE KIDNEY INJURY (HCC): Primary | ICD-10-CM

## 2022-07-18 LAB
ANION GAP SERPL CALC-SCNC: 5 MMOL/L (ref 7–16)
BUN SERPL-MCNC: 24 MG/DL (ref 8–23)
CALCIUM SERPL-MCNC: 9.3 MG/DL (ref 8.3–10.4)
CHLORIDE SERPL-SCNC: 104 MMOL/L (ref 98–107)
CO2 SERPL-SCNC: 28 MMOL/L (ref 21–32)
CREAT SERPL-MCNC: 1.4 MG/DL (ref 0.8–1.5)
GLUCOSE SERPL-MCNC: 98 MG/DL (ref 65–100)
POTASSIUM SERPL-SCNC: 5.1 MMOL/L (ref 3.5–5.1)
SODIUM SERPL-SCNC: 137 MMOL/L (ref 138–145)

## 2022-07-18 PROCEDURE — 4004F PT TOBACCO SCREEN RCVD TLK: CPT | Performed by: UROLOGY

## 2022-07-18 PROCEDURE — 1111F DSCHRG MED/CURRENT MED MERGE: CPT | Performed by: UROLOGY

## 2022-07-18 PROCEDURE — G8427 DOCREV CUR MEDS BY ELIG CLIN: HCPCS | Performed by: UROLOGY

## 2022-07-18 PROCEDURE — G8417 CALC BMI ABV UP PARAM F/U: HCPCS | Performed by: UROLOGY

## 2022-07-18 PROCEDURE — 99214 OFFICE O/P EST MOD 30 MIN: CPT | Performed by: UROLOGY

## 2022-07-18 PROCEDURE — 1123F ACP DISCUSS/DSCN MKR DOCD: CPT | Performed by: UROLOGY

## 2022-07-18 NOTE — PROGRESS NOTES
[facility]  [address] [place] [address] Way  [address] [facility], [address] [facility]  [phone]    Kristyn Flaherty  : 1940    Chief Complaint   Patient presents with    Other     Micro hematuria        HPI     Kristyn Flaherty is a 80 y.o. male  who was seen by Dr. Kamila Cuellar most recently in . At that time, he had been found to have a 5-cm mass in the right kidney. Surgical treatment had been recommended but the patient refused to consider surgery. The patient is  admitted 22 with some right lower quadrant pain. He did have some dysuria. No fever or chills. CT abdomen and pelvis without contrast from 2022 shows mild right hydronephrosis due to a 2-3-mm calculus at the right ureterovesical junction. There is a 2.5-cm stone within the urinary bladder similar to a CT in 2019. There is a large soft tissue mass at the upper pole of the right kidney which has increased in size and measures about 9.5 cm. This measured 7 cm on a CT in 2019 and 5.3 cm on the 2015 study. There is an enlarged prostate gland. There is a large fat-containing left inguinal hernia and small fat-containing right inguinal hernia appearing grossly similar to prior exam, but no evidence of colitis or diverticulitis. Lab work:  Creatinine is 2.14, up from 1.17 in 2019. His lactic acid is less than 0.1 but procalcitonin elevated at 2.22. The patient currently is having some right lower quadrant pain. He has no pain from his hernia. SURGERIES:  1.  Kidney stone. 2.  He had a laparoscopic cholecystectomy in the past.     SOCIAL HISTORY:  He is a former smoker. ALLERGIES:  NONE. MEDICATIONS:  No medicines. PHYSICAL EXAMINATION:  VITAL SIGNS:  Temp 98.2, pulse 67, respiration 17. GENERAL:  He is alert and oriented x3. LUNGS:  Clear. HEART:  Regular rate and rhythm. ABDOMEN:  He is mildly tender on the right upper quadrant without a mass.   GENITALS:  A moderate-sized reducible hernia in the left inguinal area which is nontender. The testicles are without masses. LABORATORY DATA:  Creatinine is elevated as noted 2.14. White count elevated at 13.3 with a hemoglobin of 14.5. Urine shows greater than 100 WBC, 2+ bacteria, large leukocytes. ASSESSMENT:  1. A 3-mm right distal ureteral calculus. 2.  Probable urinary tract infection with elevated white count and elevated procalcitonin. 3.  A 9.5-cm right upper renal mass which has grown in size from 2015. 4.  Left inguinal hernia which is asymptomatic. 5.  Bladder stone. 6.  Benign prostatic hyperplasia. On 07/08/2022 had   PROCEDURE PERFORMED:  Cystourethroscopy and unsuccessful attempt at cannulation of the right ureteral orifice. FINDINGS:  I identified what I suspect was very likely to be the right ureteral orifice although due to his prostatomegaly and edema of his trigone, I am not 100% completely sure was ureteral orifice but I believe it was and I tried multiple times to cannulate it using different wires and approaches and was unable to do so and it simply caused the bleeding at the site. He states that he went home and stopped having right flank pain after 2 days. Cr 1.7 on 7-9-22. Past Medical History:   Diagnosis Date    Kidney stone      Past Surgical History:   Procedure Laterality Date    CYSTOSCOPY Right 7/8/2022    CYSTOSCOPY performed by Malissa Snider MD at [facility] MAIN OR    HEENT      nose    LAP,CHOLECYSTECTOMY       No current outpatient medications on file. No current facility-administered medications for this visit.      Allergies   Allergen Reactions    Penicillins Other (See Comments)     Social History     Socioeconomic History    Marital status: Single     Spouse name: Not on file    Number of children: Not on file    Years of education: Not on file    Highest education level: Not on file   Occupational History    Not on file   Tobacco Use    Smoking status: Former    Smokeless tobacco: Not on file Substance and Sexual Activity    Alcohol use: No    Drug use: Not on file    Sexual activity: Not on file   Other Topics Concern    Not on file   Social History Narrative    Not on file     Social Determinants of Health     Financial Resource Strain: Not on file   Food Insecurity: Not on file   Transportation Needs: Not on file   Physical Activity: Not on file   Stress: Not on file   Social Connections: Not on file   Intimate Partner Violence: Not on file   Housing Stability: Not on file     Family History   Problem Relation Age of Onset    Cancer Mother        Review of Systems  Constitutional: Negative  Genitourinary: Genitourinary negative  Musculoskeletal: Musculoskeletal negative    There were no vitals taken for this visit. Not able to give UA today . Physical Exam    Assessment and Plan    ICD-10-CM    1. Acute kidney injury ([facility] [address]  N66.9 Basic Metabolic Panel      2. Renal mass  N28.89       3. Ureteral stone  N20.1       4. Bladder stone  N21.0         Right renal mass is increasing in size. Will get BMP today. Call patient with results  Plan CT or MRI with contrast to better characterize right renal mass. Likely needs right nephrectomy, possibly HALN. Needs imaging of chest.   May have passed the small right ureteral stone. Will treat bladder stone/BPH in future. Orders Placed This Encounter   Procedures    Basic Metabolic Panel     Standing Status:   Future     Standing Expiration Date:   7/18/2023     He was here with his cousin, Shravan Bosch, today. Follow-up and Dispositions    Return for call patient to arrange follow-up.

## 2022-07-20 ENCOUNTER — OFFICE VISIT (OUTPATIENT)
Dept: FAMILY MEDICINE CLINIC | Facility: CLINIC | Age: 82
End: 2022-07-20
Payer: MEDICARE

## 2022-07-20 VITALS
OXYGEN SATURATION: 99 % | HEART RATE: 74 BPM | HEIGHT: 72 IN | SYSTOLIC BLOOD PRESSURE: 146 MMHG | WEIGHT: 177.6 LBS | DIASTOLIC BLOOD PRESSURE: 86 MMHG | BODY MASS INDEX: 24.06 KG/M2 | TEMPERATURE: 97.8 F

## 2022-07-20 DIAGNOSIS — R35.0 BENIGN PROSTATIC HYPERPLASIA WITH URINARY FREQUENCY: ICD-10-CM

## 2022-07-20 DIAGNOSIS — N21.0 BLADDER STONE: ICD-10-CM

## 2022-07-20 DIAGNOSIS — N17.9 ACUTE KIDNEY INJURY (HCC): Primary | ICD-10-CM

## 2022-07-20 DIAGNOSIS — N40.1 BENIGN PROSTATIC HYPERPLASIA WITH URINARY FREQUENCY: ICD-10-CM

## 2022-07-20 DIAGNOSIS — C64.1 MALIGNANT NEOPLASM OF RIGHT KIDNEY, EXCEPT RENAL PELVIS (HCC): ICD-10-CM

## 2022-07-20 DIAGNOSIS — C64.1 RENAL CELL CANCER, RIGHT (HCC): ICD-10-CM

## 2022-07-20 PROBLEM — K85.90 ACUTE PANCREATITIS: Status: RESOLVED | Noted: 2019-08-27 | Resolved: 2022-07-20

## 2022-07-20 PROCEDURE — 99204 OFFICE O/P NEW MOD 45 MIN: CPT | Performed by: NURSE PRACTITIONER

## 2022-07-20 PROCEDURE — 1036F TOBACCO NON-USER: CPT | Performed by: NURSE PRACTITIONER

## 2022-07-20 PROCEDURE — 1123F ACP DISCUSS/DSCN MKR DOCD: CPT | Performed by: NURSE PRACTITIONER

## 2022-07-20 PROCEDURE — 1111F DSCHRG MED/CURRENT MED MERGE: CPT | Performed by: NURSE PRACTITIONER

## 2022-07-20 PROCEDURE — G8427 DOCREV CUR MEDS BY ELIG CLIN: HCPCS | Performed by: NURSE PRACTITIONER

## 2022-07-20 PROCEDURE — G8420 CALC BMI NORM PARAMETERS: HCPCS | Performed by: NURSE PRACTITIONER

## 2022-07-20 ASSESSMENT — ENCOUNTER SYMPTOMS
SHORTNESS OF BREATH: 0
ABDOMINAL PAIN: 0
BACK PAIN: 0
RHINORRHEA: 0
COUGH: 0
SORE THROAT: 0
CONSTIPATION: 0
NAUSEA: 0
DIARRHEA: 0
EYE PAIN: 0
SINUS PAIN: 0
VOMITING: 0

## 2022-07-20 NOTE — PROGRESS NOTES
Michaelle Ellis (:  1940) is a {New vs Established:[id number]}, here for evaluation of the following:    Assessment & Plan   Below is the assessment and plan developed based on review of pertinent history, physical exam, labs, studies, and medications. {There are no diagnoses linked to this encounter. (Refresh or delete this SmartLink)}  No follow-ups on file. Subjective   HPI  Review of Systems   Constitutional:  Negative for activity change, appetite change, fatigue and fever. HENT:  Negative for congestion, ear pain, rhinorrhea, sinus pain and sore throat. Eyes:  Negative for pain and visual disturbance. Respiratory:  Negative for cough and shortness of breath. Cardiovascular:  Negative for chest pain and palpitations. Gastrointestinal:  Negative for abdominal pain, constipation, diarrhea, nausea and vomiting. Genitourinary:  Negative for dysuria, frequency, hematuria and urgency. Musculoskeletal:  Negative for arthralgias and back pain. Skin:  Negative for rash. Neurological:  Negative for dizziness and headaches. Objective   Patient-Reported Vitals  No data recorded     Physical Exam  {Virtual visit PE with detailed exam Optional:[id number]}       {Time Documentation Optional:407474925}    Michaelle Ellis, was evaluated through a synchronous (real-time) audio-video encounter. The patient (or guardian if applicable) is aware that this is a billable service, which includes applicable co-pays. This Virtual Visit was conducted with patient's (and/or legal guardian's) consent. The visit was conducted pursuant to the emergency declaration under the [address] [place] [address] authority and the [employer] General Act. Patient identification was verified, and a caregiver was present when appropriate. The patient was located at {[facility] - Patient:[id number]}.    Provider was located at [facility]

## 2022-07-20 NOTE — PROGRESS NOTES
Umair Nichols is a 80 y.o. male new patient who presents to establish PCP care. Chief Complaint   Patient presents with    Acute Renal Failure     Due to kidney stone was on an antibiotic but is done does not know the name of it. 3       Presents to establish care. He hasn't seen any doctor for about 7 years. Remote history of gallbladder surgery. Nephrolithiasis in 2015, imaging at that time revealed right renal mass consistent with carcinoma. Patient elected not to have surgery at that time. Reoccurrence of his stones in the last month. Renal mass has enlarged and continues to be consistent with carcinoma. Reports \"big white thing\" in his bladder on imaging. Was in the hospital, had an acute kidney injury. Complaints of vague abdominal pain. Is unable to give details on location, duration, onset. Reports excessive urinary frequency. Has counted and was as high as 28 trips to the bathroom for urination in one 24 hour period. Reports he is down to about 10. States he eliminated coffee and sodas from his diet. Drinks 2-3 glasses of hot tea and 2-8 oz. glasses of water daily. Saw urology 7/18/22. CMP was collected at that time, kidney function is improving. Review of imaging reveals a 2.5 cm bladder stone, 7 cm right renal mass. Patient is aware that it is consistent with cancer, but has concerns about being able to care for himself and his house after surgery. He is concerned about weight loss. He states his weight was not measured in the hospital and he truly does not know what he weighed at that time, believes it was around 190lbs. Reports decreased appetite. Eats small snacks during the day. Is active and lifts weights 3 times a week. PMH   has a past medical history of Acute pancreatitis, Kidney stone, Kidney stone, and Other acute pancreatitis with uninfected necrosis.       Allergies   Allergen Reactions    Penicillins Other (See Comments)       No current outpatient medications on file prior to visit. No current facility-administered medications on file prior to visit. HOSPITALIZATIONS  See records    PREVIOUS PRIMARY CARE PROVIDER  N/A    RECORDS  Provided by patient: none   Records available in EMR and [facility] and reviewed    SPECIALISTS  Urology      Review of Systems:  Review of Systems   Constitutional:  Negative for activity change, appetite change, fatigue and fever. HENT:  Negative for congestion, ear pain, rhinorrhea, sinus pain and sore throat. Eyes:  Negative for pain and visual disturbance. Respiratory:  Negative for cough and shortness of breath. Cardiovascular:  Negative for chest pain and palpitations. Gastrointestinal:  Negative for abdominal pain, constipation, diarrhea, nausea and vomiting. Genitourinary:  Negative for dysuria, frequency, hematuria and urgency. Musculoskeletal:  Negative for arthralgias and back pain. Skin:  Negative for rash. Neurological:  Negative for dizziness and headaches. PHYSICAL EXAM   BP (!) 146/86 (Site: Left Upper Arm, Position: Sitting, Cuff Size: Medium Adult)   Pulse 74   Temp 97.8 °F (36.6 °C) (Oral)   Ht 6' (1.829 m)   Wt 177 lb 9.6 oz (80.6 kg)   SpO2 99%   BMI 24.09 kg/m²        Physical Exam  Vitals reviewed. Constitutional:       Appearance: Normal appearance. HENT:      Head: Normocephalic and atraumatic. Eyes:      Extraocular Movements: Extraocular movements intact. Pupils: Pupils are equal, round, and reactive to light. Cardiovascular:      Rate and Rhythm: Normal rate and regular rhythm. Heart sounds: Normal heart sounds. Pulmonary:      Effort: Pulmonary effort is normal.      Breath sounds: Normal breath sounds. Abdominal:      General: Abdomen is flat. Skin:     General: Skin is warm and dry. Neurological:      General: No focal deficit present. Mental Status: He is alert and oriented to person, place, and time.         No results found for this visit on 07/20/22. DIAGNOSIS  Acute kidney injury ([address]  Malignant neoplasm of right kidney, except renal pelvis (HCC)  Renal cell cancer, right [facility])  Bladder stone  Benign prostatic hyperplasia with urinary frequency     Lengthy discussion with patient regarding progression of disease process if the mass in his kidney is indeed cancer. He continues to decline further work up at this time, but will consider it. Offered flomax for BPH. He does not want to start that at this time, will consider. Will monitor weight at next visit. Encouraged to try boost or ensure as a supplement, but not as a meal replacement. Is very anxious today, will reevaluate blood pressure at next visit. Advised to increase water intake to at least 4-6 cups daily, although I am concerned this will worse his LUTS. Pt was encouraged to sign up/go to [employer] for access to information at a later time. Return in about 4 weeks (around 8/17/2022).            Zuleyka Gates, KD - CNP   [address]  [facility] [address]  Dept: [phone]  Dept Fax: [phone]

## 2022-07-22 ENCOUNTER — TELEPHONE (OUTPATIENT)
Dept: UROLOGY | Age: 82
End: 2022-07-22

## 2022-07-22 DIAGNOSIS — N28.89 RENAL MASS: Primary | ICD-10-CM

## 2022-07-22 NOTE — TELEPHONE ENCOUNTER
Creat now normal at 1.4  Needs imaging for right renal mass. Diagnosis Orders   1.  Renal mass  CT CHEST ABDOMEN PELVIS W WO CONTRAST        Call patient with results  Let pt know that we are ordering CT

## 2022-08-04 NOTE — TELEPHONE ENCOUNTER
Called pt informed him of message, he understood and states he does not want to schedule CT right now, and he will call me back when he is ready.    PAVEL

## 2022-08-07 PROBLEM — N39.0 UTI (URINARY TRACT INFECTION): Status: RESOLVED | Noted: 2022-07-08 | Resolved: 2022-08-07

## 2022-08-11 NOTE — TELEPHONE ENCOUNTER
He has a renal mass which is probably cancer, which may be life-threatening. Please let him know. Can have appt if he wants.

## 2022-08-16 NOTE — TELEPHONE ENCOUNTER
Called pt no answer, lvm informing him to call back and reconsider CT scan as he had a renal mass and the doctor is concerned about cancer.

## 2022-08-17 ENCOUNTER — OFFICE VISIT (OUTPATIENT)
Dept: FAMILY MEDICINE CLINIC | Facility: CLINIC | Age: 82
End: 2022-08-17
Payer: MEDICARE

## 2022-08-17 VITALS
HEIGHT: 72 IN | DIASTOLIC BLOOD PRESSURE: 80 MMHG | BODY MASS INDEX: 24.79 KG/M2 | OXYGEN SATURATION: 98 % | HEART RATE: 82 BPM | RESPIRATION RATE: 12 BRPM | SYSTOLIC BLOOD PRESSURE: 122 MMHG | TEMPERATURE: 98 F | WEIGHT: 183 LBS

## 2022-08-17 DIAGNOSIS — R35.0 BENIGN PROSTATIC HYPERPLASIA WITH URINARY FREQUENCY: ICD-10-CM

## 2022-08-17 DIAGNOSIS — K59.09 OTHER CONSTIPATION: ICD-10-CM

## 2022-08-17 DIAGNOSIS — N40.1 BENIGN PROSTATIC HYPERPLASIA WITH URINARY FREQUENCY: ICD-10-CM

## 2022-08-17 DIAGNOSIS — N28.89 RENAL MASS: Primary | ICD-10-CM

## 2022-08-17 DIAGNOSIS — Z59.9 FINANCIAL DIFFICULTIES: ICD-10-CM

## 2022-08-17 PROBLEM — N17.9 ACUTE KIDNEY INJURY (HCC): Status: RESOLVED | Noted: 2022-07-08 | Resolved: 2022-08-17

## 2022-08-17 PROBLEM — N13.30 HYDRONEPHROSIS: Status: RESOLVED | Noted: 2022-07-08 | Resolved: 2022-08-17

## 2022-08-17 PROBLEM — N20.1 URETERAL STONE: Status: RESOLVED | Noted: 2022-07-08 | Resolved: 2022-08-17

## 2022-08-17 PROCEDURE — G8420 CALC BMI NORM PARAMETERS: HCPCS | Performed by: NURSE PRACTITIONER

## 2022-08-17 PROCEDURE — G8427 DOCREV CUR MEDS BY ELIG CLIN: HCPCS | Performed by: NURSE PRACTITIONER

## 2022-08-17 PROCEDURE — 1123F ACP DISCUSS/DSCN MKR DOCD: CPT | Performed by: NURSE PRACTITIONER

## 2022-08-17 PROCEDURE — 99214 OFFICE O/P EST MOD 30 MIN: CPT | Performed by: NURSE PRACTITIONER

## 2022-08-17 PROCEDURE — 1036F TOBACCO NON-USER: CPT | Performed by: NURSE PRACTITIONER

## 2022-08-17 SDOH — ECONOMIC STABILITY - INCOME SECURITY: PROBLEM RELATED TO HOUSING AND ECONOMIC CIRCUMSTANCES, UNSPECIFIED: Z59.9

## 2022-08-17 ASSESSMENT — ENCOUNTER SYMPTOMS
CONSTIPATION: 0
RHINORRHEA: 0
ABDOMINAL PAIN: 0
BACK PAIN: 0
SHORTNESS OF BREATH: 0
SORE THROAT: 0
COUGH: 0
NAUSEA: 0
DIARRHEA: 0
VOMITING: 0
EYE PAIN: 0
SINUS PAIN: 0

## 2022-08-17 ASSESSMENT — PATIENT HEALTH QUESTIONNAIRE - PHQ9
SUM OF ALL RESPONSES TO PHQ9 QUESTIONS 1 & 2: 0
1. LITTLE INTEREST OR PLEASURE IN DOING THINGS: 0
SUM OF ALL RESPONSES TO PHQ QUESTIONS 1-9: 0
2. FEELING DOWN, DEPRESSED OR HOPELESS: 0
SUM OF ALL RESPONSES TO PHQ QUESTIONS 1-9: 0

## 2022-08-17 ASSESSMENT — ANXIETY QUESTIONNAIRES
6. BECOMING EASILY ANNOYED OR IRRITABLE: 0
GAD7 TOTAL SCORE: 0
2. NOT BEING ABLE TO STOP OR CONTROL WORRYING: 0
5. BEING SO RESTLESS THAT IT IS HARD TO SIT STILL: 0
4. TROUBLE RELAXING: 0
1. FEELING NERVOUS, ANXIOUS, OR ON EDGE: 0
IF YOU CHECKED OFF ANY PROBLEMS ON THIS QUESTIONNAIRE, HOW DIFFICULT HAVE THESE PROBLEMS MADE IT FOR YOU TO DO YOUR WORK, TAKE CARE OF THINGS AT HOME, OR GET ALONG WITH OTHER PEOPLE: NOT DIFFICULT AT ALL
7. FEELING AFRAID AS IF SOMETHING AWFUL MIGHT HAPPEN: 0
3. WORRYING TOO MUCH ABOUT DIFFERENT THINGS: 0

## 2022-08-17 NOTE — PROGRESS NOTES
Neli Jimenez (:  1940) is a 80 y.o. male,Established patient, here for evaluation of the following chief complaint(s):  Follow-up (Kidney Issues, Finished antibiotic, appetite is back)         ASSESSMENT/PLAN:  1. Renal mass  2. Benign prostatic hyperplasia with urinary frequency  3. Financial difficulties  -     [facility] Care Management  4. Other constipation    Much improved in terms of appetite, weight and blood pressure. He is much less anxious today. Continues to decline further work up for his renal mass. Does not want to start flomax at this time. Feels his BPH is not bothersome enough to start another medication. Will refer to case management to assist with resources. Recommended miralax daily for constipation. Return for AWV this year and in 6 months for follow up. Subjective   SUBJECTIVE/OBJECTIVE:  Feeling better. Appetite is back and he has gained a little weight. He is having some constipation, wonders if he can try miralax. Reports this has been a problem since he had his gallbladder out. Can go 4-5 days without a BM. Is improving his water intake. Has been drinking 4-6 glasses a water daily, sometimes more. Reports he is urinating about 10 times daily. He is happy with this.  [facility] wants him to have a CT scan to re-evaluate his renal mass. He is not very happy with [facility], had a bad experience with them 5 years or so ago. He does not want to have surgery at this time. He is very concerned about what he will do while he is recovering. He is also concerned about the quality of care in the hospital and in rehab after the surgery. Wonders if he may qualify for any other resources like medicaid. Reports he does sometimes have difficulties paying his bills. Review of Systems   Constitutional:  Negative for activity change, appetite change, fatigue and fever. HENT:  Negative for congestion, ear pain, rhinorrhea, sinus pain and sore throat.     Eyes:  Negative for pain and visual disturbance. Respiratory:  Negative for cough and shortness of breath. Cardiovascular:  Negative for chest pain and palpitations. Gastrointestinal:  Negative for abdominal pain, constipation, diarrhea, nausea and vomiting. Genitourinary:  Negative for dysuria, frequency, hematuria and urgency. Musculoskeletal:  Negative for arthralgias and back pain. Skin:  Negative for rash. Neurological:  Negative for dizziness and headaches. Objective   Physical Exam  Vitals reviewed. Constitutional:       Appearance: Normal appearance. HENT:      Head: Normocephalic and atraumatic. Eyes:      Extraocular Movements: Extraocular movements intact. Pupils: Pupils are equal, round, and reactive to light. Cardiovascular:      Rate and Rhythm: Normal rate and regular rhythm. Heart sounds: Normal heart sounds. Pulmonary:      Effort: Pulmonary effort is normal.      Breath sounds: Normal breath sounds. Abdominal:      General: Abdomen is flat. Skin:     General: Skin is warm and dry. Neurological:      General: No focal deficit present. Mental Status: He is alert and oriented to person, place, and time. An electronic signature was used to authenticate this note.     --KD Moulton - CNP

## 2022-08-18 ENCOUNTER — CARE COORDINATION (OUTPATIENT)
Dept: CARE COORDINATION | Facility: CLINIC | Age: 82
End: 2022-08-18

## 2022-08-23 ENCOUNTER — CARE COORDINATION (OUTPATIENT)
Dept: CARE COORDINATION | Facility: CLINIC | Age: 82
End: 2022-08-23

## 2022-08-23 NOTE — CARE COORDINATION
2nd VM left with pt regarding referral for resources. Will try again at a later time if no call back is received.

## 2022-08-25 NOTE — TELEPHONE ENCOUNTER
Called pt no answer, lvm again informing him MD is concerned about cancer and for him to reconsider getting CT done or if he just wants an appt to discuss this he can call back.

## 2022-08-29 ENCOUNTER — CARE COORDINATION (OUTPATIENT)
Dept: CARE COORDINATION | Facility: CLINIC | Age: 82
End: 2022-08-29

## 2022-08-29 NOTE — CARE COORDINATION
Late entry: Pt inquired about Medicaid qualifications. LINH SYLVESTER explained and pt stated that he would not qualify then. LINH SYLVESTER asked about MOW but pt stated that he does need services at this time. LINH CM not added to care team at this time.  If services are needed in the future, LINH SYLVESTER can rejoin care team.

## 2022-11-22 ENCOUNTER — OFFICE VISIT (OUTPATIENT)
Dept: FAMILY MEDICINE CLINIC | Facility: CLINIC | Age: 82
End: 2022-11-22
Payer: MEDICARE

## 2022-11-22 VITALS
TEMPERATURE: 97.8 F | OXYGEN SATURATION: 96 % | HEART RATE: 72 BPM | HEIGHT: 73 IN | DIASTOLIC BLOOD PRESSURE: 80 MMHG | WEIGHT: 189 LBS | RESPIRATION RATE: 17 BRPM | BODY MASS INDEX: 25.05 KG/M2 | SYSTOLIC BLOOD PRESSURE: 148 MMHG

## 2022-11-22 DIAGNOSIS — Z00.00 INITIAL MEDICARE ANNUAL WELLNESS VISIT: Primary | ICD-10-CM

## 2022-11-22 PROCEDURE — 99497 ADVNCD CARE PLAN 30 MIN: CPT | Performed by: NURSE PRACTITIONER

## 2022-11-22 PROCEDURE — G8484 FLU IMMUNIZE NO ADMIN: HCPCS | Performed by: NURSE PRACTITIONER

## 2022-11-22 PROCEDURE — G0438 PPPS, INITIAL VISIT: HCPCS | Performed by: NURSE PRACTITIONER

## 2022-11-22 PROCEDURE — 1123F ACP DISCUSS/DSCN MKR DOCD: CPT | Performed by: NURSE PRACTITIONER

## 2022-11-22 ASSESSMENT — ANXIETY QUESTIONNAIRES
4. TROUBLE RELAXING: 0
5. BEING SO RESTLESS THAT IT IS HARD TO SIT STILL: 0
1. FEELING NERVOUS, ANXIOUS, OR ON EDGE: 0
7. FEELING AFRAID AS IF SOMETHING AWFUL MIGHT HAPPEN: 0
GAD7 TOTAL SCORE: 1
3. WORRYING TOO MUCH ABOUT DIFFERENT THINGS: 0
IF YOU CHECKED OFF ANY PROBLEMS ON THIS QUESTIONNAIRE, HOW DIFFICULT HAVE THESE PROBLEMS MADE IT FOR YOU TO DO YOUR WORK, TAKE CARE OF THINGS AT HOME, OR GET ALONG WITH OTHER PEOPLE: NOT DIFFICULT AT ALL
2. NOT BEING ABLE TO STOP OR CONTROL WORRYING: 0
6. BECOMING EASILY ANNOYED OR IRRITABLE: 1

## 2022-11-22 ASSESSMENT — PATIENT HEALTH QUESTIONNAIRE - PHQ9
SUM OF ALL RESPONSES TO PHQ QUESTIONS 1-9: 0
1. LITTLE INTEREST OR PLEASURE IN DOING THINGS: 0
SUM OF ALL RESPONSES TO PHQ QUESTIONS 1-9: 0
SUM OF ALL RESPONSES TO PHQ QUESTIONS 1-9: 0
2. FEELING DOWN, DEPRESSED OR HOPELESS: 0
SUM OF ALL RESPONSES TO PHQ9 QUESTIONS 1 & 2: 0
SUM OF ALL RESPONSES TO PHQ QUESTIONS 1-9: 0

## 2022-11-22 NOTE — PATIENT INSTRUCTIONS
Personalized Preventive Plan for Carlitos Valencia - 11/22/2022  Medicare offers a range of preventive health benefits. Some of the tests and screenings are paid in full while other may be subject to a deductible, co-insurance, and/or copay. Some of these benefits include a comprehensive review of your medical history including lifestyle, illnesses that may run in your family, and various assessments and screenings as appropriate. After reviewing your medical record and screening and assessments performed today your provider may have ordered immunizations, labs, imaging, and/or referrals for you. A list of these orders (if applicable) as well as your Preventive Care list are included within your After Visit Summary for your review. Other Preventive Recommendations:    A preventive eye exam performed by an eye specialist is recommended every 1-2 years to screen for glaucoma; cataracts, macular degeneration, and other eye disorders. A preventive dental visit is recommended every 6 months. Try to get at least 150 minutes of exercise per week or 10,000 steps per day on a pedometer . Order or download the FREE \"Exercise & Physical Activity: Your Everyday Guide\" from [employer]. Call 7-881.805.6372 or search [employer] online. You need 4766-4792 mg of calcium and 5182-5211 IU of vitamin D per day. It is possible to meet your calcium requirement with diet alone, but a vitamin D supplement is usually necessary to meet this goal.  When exposed to the sun, use a sunscreen that protects against both UVA and UVB radiation with an SPF of 30 or greater. Reapply every 2 to 3 hours or after sweating, drying off with a towel, or swimming. Always wear a seat belt when traveling in a car. Always wear a helmet when riding a bicycle or motorcycle.

## 2022-11-22 NOTE — ACP (ADVANCE CARE PLANNING)
Advance Care Planning     Advance Care Planning (ACP) Physician/NP/PA Conversation    Date of Conversation: 11/22/2022  Conducted with: Patient with Decision Making Capacity    Healthcare Decision Maker:      Primary Decision Maker: Viktoria Jurado - Darshan - [phone]    Click here to complete Healthcare Decision Makers including selection of the Healthcare Decision Maker Relationship (ie \"Primary\")  Today we documented Decision Maker(s). The patient will provide ACP documents. Care Preferences:    Hospitalization: \"If your health worsens and it becomes clear that your chance of recovery is unlikely, what would be your preference regarding hospitalization? \"  The patient is unsure. Ventilation: \"If you were unable to breath on your own and your chance of recovery was unlikely, what would be your preference about the use of a ventilator (breathing machine) if it was available to you? \"  The patient is unsure. Resuscitation: \"In the event your heart stopped as a result of an underlying serious health condition, would you want attempts made to restart your heart, or would you prefer a natural death? \"  The patient is unsure.     resuscitation preferences and end of life care preferences (vegetative state/imminent death)    Conversation Outcomes / Follow-Up Plan:  ACP in process - information provided, considering goals and options  Reviewed DNR/DNI and patient elects Full Code (Attempt Resuscitation)    Length of Voluntary ACP Conversation in minutes:  12 minutes    KD Walker - CNP

## 2022-11-22 NOTE — PROGRESS NOTES
Medicare Annual Wellness Visit    Maldonado Carlin is here for Annual Exam    Assessment & Plan   Initial Medicare annual wellness visit    Recommendations for Preventive Services Due: see orders and patient instructions/AVS.  Recommended screening schedule for the next 5-10 years is provided to the patient in written form: see Patient Instructions/AVS.     Patient refused labs today. Return for Medicare Annual Wellness Visit in 1 year. Subjective       Patient's complete Health Risk Assessment and screening values have been reviewed and are found in Flowsheets. The following problems were reviewed today and where indicated follow up appointments were made and/or referrals ordered.     Positive Risk Factor Screenings with Interventions:             General Health and ACP:  General  In general, how would you say your health is?: Good  In the past 7 days, have you experienced any of the following: New or Increased Pain, New or Increased Fatigue, Loneliness, Social Isolation, Stress or Anger?: No  Do you get the social and emotional support that you need?: Yes  Do you have a Living Will?: (!) No    Advance Directives       Power of  Living Will ACP-Advance Directive ACP-Power of     Not on File Not on File Not on File Not on File        General Health Risk Interventions:  No Living Will: Advance Care Planning addressed with patient today    Health Habits/Nutrition:  Physical Activity: Sufficiently Active    Days of Exercise per Week: 5 days    Minutes of Exercise per Session: 60 min     Have you lost any weight without trying in the past 3 months?: No  Body mass index: 24.93  Have you seen the dentist within the past year?: (!) No  Health Habits/Nutrition Interventions:  Dental exam overdue:  patient encouraged to make appointment with his/her dentist    Hearing/Vision:  Do you or your family notice any trouble with your hearing that hasn't been managed with hearing aids?: (!) Yes  Do you have difficulty driving, watching TV, or doing any of your daily activities because of your eyesight?: No  Have you had an eye exam within the past year?: Yes  No results found. Hearing/Vision Interventions:  Hearing concerns:  patient declines any further evaluation/treatment for hearing issues    Safety:  Do you have working smoke detectors?: (!) No  Do you have any tripping hazards - loose or unsecured carpets or rugs?: (!) Yes  Do you have any tripping hazards - clutter in doorways, halls, or stairs?: No  Do you have either shower bars, grab bars, non-slip mats or non-slip surfaces in your shower or bathtub?: (!) No  Do all of your stairways have a railing or banister?: (!) No  Do you always fasten your seatbelt when you are in a car?: Yes  Safety Interventions:  Home safety tips provided           Objective   Vitals:    11/22/22 0851   BP: (!) 148/80   Pulse: 72   Resp: 17   Temp: 97.8 °F (36.6 °C)   TempSrc: Oral   SpO2: 96%   Weight: 189 lb (85.7 kg)   Height: 6' 1\" (1.854 m)      Body mass index is 24.94 kg/m².              Allergies   Allergen Reactions    Penicillins Other (See Comments)     Prior to Visit Medications    Not on File       CareTeam (Including outside providers/suppliers regularly involved in providing care):   Patient Care Team:  KD Eldridge CNP as PCP - General (Nurse Practitioner)  KD Eldridge CNP as PCP - Kyle Namrata Mckinneyled Provider     Reviewed and updated this visit:  Tobacco  Allergies  Meds  Problems  Med Hx  Surg Hx  Soc Hx  Fam Hx

## 2023-02-17 ENCOUNTER — OFFICE VISIT (OUTPATIENT)
Dept: FAMILY MEDICINE CLINIC | Facility: CLINIC | Age: 83
End: 2023-02-17
Payer: MEDICARE

## 2023-02-17 VITALS
HEART RATE: 74 BPM | HEIGHT: 73 IN | SYSTOLIC BLOOD PRESSURE: 136 MMHG | BODY MASS INDEX: 24.78 KG/M2 | WEIGHT: 187 LBS | OXYGEN SATURATION: 96 % | TEMPERATURE: 98 F | DIASTOLIC BLOOD PRESSURE: 82 MMHG

## 2023-02-17 DIAGNOSIS — C64.1 MALIGNANT NEOPLASM OF RIGHT KIDNEY, EXCEPT RENAL PELVIS (HCC): Primary | ICD-10-CM

## 2023-02-17 DIAGNOSIS — Z12.5 ENCOUNTER FOR SCREENING FOR MALIGNANT NEOPLASM OF PROSTATE: ICD-10-CM

## 2023-02-17 DIAGNOSIS — R35.0 BENIGN PROSTATIC HYPERPLASIA WITH URINARY FREQUENCY: ICD-10-CM

## 2023-02-17 DIAGNOSIS — N40.1 BENIGN PROSTATIC HYPERPLASIA WITH URINARY FREQUENCY: ICD-10-CM

## 2023-02-17 DIAGNOSIS — Z13.220 LIPID SCREENING: ICD-10-CM

## 2023-02-17 LAB
BASOPHILS # BLD: 0.1 K/UL (ref 0–0.2)
BASOPHILS NFR BLD: 1 % (ref 0–2)
DIFFERENTIAL METHOD BLD: NORMAL
EOSINOPHIL # BLD: 0.1 K/UL (ref 0–0.8)
EOSINOPHIL NFR BLD: 2 % (ref 0.5–7.8)
ERYTHROCYTE [DISTWIDTH] IN BLOOD BY AUTOMATED COUNT: 13.7 % (ref 11.9–14.6)
HCT VFR BLD AUTO: 45.3 % (ref 41.1–50.3)
HGB BLD-MCNC: 14.8 G/DL (ref 13.6–17.2)
IMM GRANULOCYTES # BLD AUTO: 0 K/UL (ref 0–0.5)
IMM GRANULOCYTES NFR BLD AUTO: 0 % (ref 0–5)
LYMPHOCYTES # BLD: 1.1 K/UL (ref 0.5–4.6)
LYMPHOCYTES NFR BLD: 22 % (ref 13–44)
MCH RBC QN AUTO: 30.5 PG (ref 26.1–32.9)
MCHC RBC AUTO-ENTMCNC: 32.7 G/DL (ref 31.4–35)
MCV RBC AUTO: 93.4 FL (ref 82–102)
MONOCYTES # BLD: 0.4 K/UL (ref 0.1–1.3)
MONOCYTES NFR BLD: 8 % (ref 4–12)
NEUTS SEG # BLD: 3.4 K/UL (ref 1.7–8.2)
NEUTS SEG NFR BLD: 67 % (ref 43–78)
NRBC # BLD: 0 K/UL (ref 0–0.2)
PLATELET # BLD AUTO: 225 K/UL (ref 150–450)
PMV BLD AUTO: 10.3 FL (ref 9.4–12.3)
PSA SERPL-MCNC: 1.5 NG/ML
RBC # BLD AUTO: 4.85 M/UL (ref 4.23–5.6)
WBC # BLD AUTO: 5 K/UL (ref 4.3–11.1)

## 2023-02-17 PROCEDURE — 1123F ACP DISCUSS/DSCN MKR DOCD: CPT | Performed by: NURSE PRACTITIONER

## 2023-02-17 PROCEDURE — G8484 FLU IMMUNIZE NO ADMIN: HCPCS | Performed by: NURSE PRACTITIONER

## 2023-02-17 PROCEDURE — 99214 OFFICE O/P EST MOD 30 MIN: CPT | Performed by: NURSE PRACTITIONER

## 2023-02-17 PROCEDURE — G8420 CALC BMI NORM PARAMETERS: HCPCS | Performed by: NURSE PRACTITIONER

## 2023-02-17 PROCEDURE — G8427 DOCREV CUR MEDS BY ELIG CLIN: HCPCS | Performed by: NURSE PRACTITIONER

## 2023-02-17 PROCEDURE — 1036F TOBACCO NON-USER: CPT | Performed by: NURSE PRACTITIONER

## 2023-02-17 SDOH — ECONOMIC STABILITY: FOOD INSECURITY: WITHIN THE PAST 12 MONTHS, YOU WORRIED THAT YOUR FOOD WOULD RUN OUT BEFORE YOU GOT MONEY TO BUY MORE.: NEVER TRUE

## 2023-02-17 SDOH — ECONOMIC STABILITY: INCOME INSECURITY

## 2023-02-17 SDOH — ECONOMIC STABILITY: HOUSING INSECURITY
IN THE LAST 12 MONTHS, WAS THERE A TIME WHEN YOU DID NOT HAVE A STEADY PLACE TO SLEEP OR SLEPT IN A SHELTER (INCLUDING NOW)?: YES

## 2023-02-17 SDOH — ECONOMIC STABILITY: FOOD INSECURITY: WITHIN THE PAST 12 MONTHS, THE FOOD YOU BOUGHT JUST DIDN'T LAST AND YOU DIDN'T HAVE MONEY TO GET MORE.: NEVER TRUE

## 2023-02-17 ASSESSMENT — ENCOUNTER SYMPTOMS
ABDOMINAL PAIN: 0
CONSTIPATION: 0
SHORTNESS OF BREATH: 0
NAUSEA: 0
BACK PAIN: 0
COUGH: 0
DIARRHEA: 0
VOMITING: 0
SORE THROAT: 0
EYE PAIN: 0
RHINORRHEA: 0
SINUS PAIN: 0

## 2023-02-17 ASSESSMENT — PATIENT HEALTH QUESTIONNAIRE - PHQ9
DEPRESSION UNABLE TO ASSESS: PT REFUSES
1. LITTLE INTEREST OR PLEASURE IN DOING THINGS: 0
SUM OF ALL RESPONSES TO PHQ QUESTIONS 1-9: 0

## 2023-02-17 NOTE — PROGRESS NOTES
Michelle Brantley (:  1940) is a [de-identified] y.o. male,Established patient, here for evaluation of the following chief complaint(s):  Follow-up Chronic Condition         ASSESSMENT/PLAN:  1. Malignant neoplasm of right kidney, except renal pelvis (HCC)  -     CBC with Auto Differential; Future  -     Comprehensive Metabolic Panel; Future  2. Benign prostatic hyperplasia with urinary frequency  -     PSA Screening; Future  3. Lipid screening  -     Lipid Panel; Future  4. Encounter for screening for malignant neoplasm of prostate   -     PSA Screening; Future    Continues to decline work up for his renal mass, bladder stone. He understands the risk of this; we have discussed it at length in the past.   BPH symptoms tolerable, patient declines treatment. Will check his labs today. Call with results and plan. Has AWV scheduled for  to follow up at that time and as needed in the interim. Subjective   SUBJECTIVE/OBJECTIVE:  Here for routine follow up. No complaints. Continues with his exercises. Has actually gained some weight. Continues to decline work up for the renal mass. Review of Systems   Constitutional:  Negative for activity change, appetite change, fatigue and fever. HENT:  Negative for congestion, ear pain, rhinorrhea, sinus pain and sore throat. Eyes:  Negative for pain and visual disturbance. Respiratory:  Negative for cough and shortness of breath. Cardiovascular:  Negative for chest pain and palpitations. Gastrointestinal:  Negative for abdominal pain, constipation, diarrhea, nausea and vomiting. Genitourinary:  Negative for dysuria, frequency, hematuria and urgency. Musculoskeletal:  Negative for arthralgias and back pain. Skin:  Negative for rash. Neurological:  Negative for dizziness and headaches. Objective   Physical Exam  Vitals reviewed. Constitutional:       Appearance: Normal appearance. HENT:      Head: Normocephalic and atraumatic. Eyes:      Extraocular Movements: Extraocular movements intact. Pupils: Pupils are equal, round, and reactive to light. Cardiovascular:      Rate and Rhythm: Normal rate and regular rhythm. Heart sounds: Normal heart sounds. Pulmonary:      Effort: Pulmonary effort is normal.      Breath sounds: Normal breath sounds. Abdominal:      General: Abdomen is flat. Skin:     General: Skin is warm and dry. Neurological:      General: No focal deficit present. Mental Status: He is alert and oriented to person, place, and time. An electronic signature was used to authenticate this note.     --KD Steve - CNP

## 2023-02-18 LAB
ALBUMIN SERPL-MCNC: 4.1 G/DL (ref 3.2–4.6)
ALBUMIN/GLOB SERPL: 1.2 (ref 0.4–1.6)
ALP SERPL-CCNC: 52 U/L (ref 50–136)
ALT SERPL-CCNC: 19 U/L (ref 12–65)
ANION GAP SERPL CALC-SCNC: 8 MMOL/L (ref 2–11)
AST SERPL-CCNC: 16 U/L (ref 15–37)
BILIRUB SERPL-MCNC: 1.2 MG/DL (ref 0.2–1.1)
BUN SERPL-MCNC: 24 MG/DL (ref 8–23)
CALCIUM SERPL-MCNC: 9.4 MG/DL (ref 8.3–10.4)
CHLORIDE SERPL-SCNC: 106 MMOL/L (ref 101–110)
CO2 SERPL-SCNC: 26 MMOL/L (ref 21–32)
CREAT SERPL-MCNC: 1.4 MG/DL (ref 0.8–1.5)
GLOBULIN SER CALC-MCNC: 3.3 G/DL (ref 2.8–4.5)
GLUCOSE SERPL-MCNC: 107 MG/DL (ref 65–100)
POTASSIUM SERPL-SCNC: 4.3 MMOL/L (ref 3.5–5.1)
PROT SERPL-MCNC: 7.4 G/DL (ref 6.3–8.2)
SODIUM SERPL-SCNC: 140 MMOL/L (ref 133–143)

## 2023-11-27 ENCOUNTER — OFFICE VISIT (OUTPATIENT)
Dept: FAMILY MEDICINE CLINIC | Facility: CLINIC | Age: 83
End: 2023-11-27
Payer: MEDICARE

## 2023-11-27 VITALS
HEIGHT: 69 IN | OXYGEN SATURATION: 97 % | TEMPERATURE: 97.7 F | HEART RATE: 80 BPM | DIASTOLIC BLOOD PRESSURE: 70 MMHG | WEIGHT: 178 LBS | SYSTOLIC BLOOD PRESSURE: 122 MMHG | BODY MASS INDEX: 26.36 KG/M2

## 2023-11-27 DIAGNOSIS — R35.0 BENIGN PROSTATIC HYPERPLASIA WITH URINARY FREQUENCY: ICD-10-CM

## 2023-11-27 DIAGNOSIS — C64.1 RENAL CELL CANCER, RIGHT (HCC): ICD-10-CM

## 2023-11-27 DIAGNOSIS — N21.0 BLADDER STONE: ICD-10-CM

## 2023-11-27 DIAGNOSIS — N40.1 BENIGN PROSTATIC HYPERPLASIA WITH URINARY FREQUENCY: ICD-10-CM

## 2023-11-27 DIAGNOSIS — Z00.00 MEDICARE ANNUAL WELLNESS VISIT, SUBSEQUENT: Primary | ICD-10-CM

## 2023-11-27 LAB
ALBUMIN SERPL-MCNC: 4.5 G/DL (ref 3.2–4.6)
ALBUMIN/GLOB SERPL: 1.4 (ref 0.4–1.6)
ALP SERPL-CCNC: 53 U/L (ref 50–136)
ALT SERPL-CCNC: 24 U/L (ref 12–65)
ANION GAP SERPL CALC-SCNC: 6 MMOL/L (ref 2–11)
AST SERPL-CCNC: 17 U/L (ref 15–37)
BILIRUB SERPL-MCNC: 1.3 MG/DL (ref 0.2–1.1)
BUN SERPL-MCNC: 20 MG/DL (ref 8–23)
CALCIUM SERPL-MCNC: 9.6 MG/DL (ref 8.3–10.4)
CHLORIDE SERPL-SCNC: 105 MMOL/L (ref 101–110)
CO2 SERPL-SCNC: 27 MMOL/L (ref 21–32)
CREAT SERPL-MCNC: 1.4 MG/DL (ref 0.8–1.5)
GLOBULIN SER CALC-MCNC: 3.2 G/DL (ref 2.8–4.5)
GLUCOSE SERPL-MCNC: 105 MG/DL (ref 65–100)
POTASSIUM SERPL-SCNC: 4.5 MMOL/L (ref 3.5–5.1)
PROT SERPL-MCNC: 7.7 G/DL (ref 6.3–8.2)
SODIUM SERPL-SCNC: 138 MMOL/L (ref 133–143)

## 2023-11-27 PROCEDURE — 1123F ACP DISCUSS/DSCN MKR DOCD: CPT | Performed by: FAMILY MEDICINE

## 2023-11-27 PROCEDURE — G0439 PPPS, SUBSEQ VISIT: HCPCS | Performed by: FAMILY MEDICINE

## 2023-11-27 ASSESSMENT — PATIENT HEALTH QUESTIONNAIRE - PHQ9
1. LITTLE INTEREST OR PLEASURE IN DOING THINGS: 0
SUM OF ALL RESPONSES TO PHQ QUESTIONS 1-9: 0
SUM OF ALL RESPONSES TO PHQ9 QUESTIONS 1 & 2: 0
SUM OF ALL RESPONSES TO PHQ QUESTIONS 1-9: 0
SUM OF ALL RESPONSES TO PHQ QUESTIONS 1-9: 0
SUM OF ALL RESPONSES TO PHQ9 QUESTIONS 1 & 2: 0
SUM OF ALL RESPONSES TO PHQ QUESTIONS 1-9: 0
SUM OF ALL RESPONSES TO PHQ QUESTIONS 1-9: 0
1. LITTLE INTEREST OR PLEASURE IN DOING THINGS: 0
2. FEELING DOWN, DEPRESSED OR HOPELESS: 0
SUM OF ALL RESPONSES TO PHQ QUESTIONS 1-9: 0
2. FEELING DOWN, DEPRESSED OR HOPELESS: 0

## 2023-11-27 ASSESSMENT — LIFESTYLE VARIABLES
HOW OFTEN DO YOU HAVE A DRINK CONTAINING ALCOHOL: NEVER
HOW MANY STANDARD DRINKS CONTAINING ALCOHOL DO YOU HAVE ON A TYPICAL DAY: PATIENT DOES NOT DRINK

## 2023-11-28 ENCOUNTER — TELEPHONE (OUTPATIENT)
Dept: FAMILY MEDICINE CLINIC | Facility: CLINIC | Age: 83
End: 2023-11-28

## 2024-06-03 ENCOUNTER — OFFICE VISIT (OUTPATIENT)
Dept: FAMILY MEDICINE CLINIC | Facility: CLINIC | Age: 84
End: 2024-06-03
Payer: MEDICARE

## 2024-06-03 VITALS
HEART RATE: 71 BPM | HEIGHT: 69 IN | BODY MASS INDEX: 26.22 KG/M2 | OXYGEN SATURATION: 98 % | WEIGHT: 177 LBS | TEMPERATURE: 97.7 F | SYSTOLIC BLOOD PRESSURE: 138 MMHG | DIASTOLIC BLOOD PRESSURE: 109 MMHG

## 2024-06-03 DIAGNOSIS — C64.1 RENAL CELL CANCER, RIGHT (HCC): ICD-10-CM

## 2024-06-03 DIAGNOSIS — N40.1 BENIGN PROSTATIC HYPERPLASIA WITH URINARY FREQUENCY: Primary | ICD-10-CM

## 2024-06-03 DIAGNOSIS — R35.0 BENIGN PROSTATIC HYPERPLASIA WITH URINARY FREQUENCY: Primary | ICD-10-CM

## 2024-06-03 DIAGNOSIS — C64.1 MALIGNANT NEOPLASM OF RIGHT KIDNEY, EXCEPT RENAL PELVIS (HCC): ICD-10-CM

## 2024-06-03 DIAGNOSIS — N40.1 BENIGN PROSTATIC HYPERPLASIA WITH URINARY FREQUENCY: ICD-10-CM

## 2024-06-03 DIAGNOSIS — R35.0 BENIGN PROSTATIC HYPERPLASIA WITH URINARY FREQUENCY: ICD-10-CM

## 2024-06-03 DIAGNOSIS — L98.491 CALLOUS ULCER, LIMITED TO BREAKDOWN OF SKIN (HCC): ICD-10-CM

## 2024-06-03 DIAGNOSIS — Z59.9 FINANCIAL DIFFICULTIES: ICD-10-CM

## 2024-06-03 LAB
ALBUMIN SERPL-MCNC: 4.3 G/DL (ref 3.2–4.6)
ALBUMIN/GLOB SERPL: 1.4 (ref 1–1.9)
ALP SERPL-CCNC: 56 U/L (ref 40–129)
ALT SERPL-CCNC: 22 U/L (ref 12–65)
ANION GAP SERPL CALC-SCNC: 11 MMOL/L (ref 9–18)
AST SERPL-CCNC: 34 U/L (ref 15–37)
BASOPHILS # BLD: 0 K/UL (ref 0–0.2)
BASOPHILS NFR BLD: 0 % (ref 0–2)
BILIRUB SERPL-MCNC: 1.3 MG/DL (ref 0–1.2)
BUN SERPL-MCNC: 22 MG/DL (ref 8–23)
CALCIUM SERPL-MCNC: 9.6 MG/DL (ref 8.8–10.2)
CHLORIDE SERPL-SCNC: 102 MMOL/L (ref 98–107)
CO2 SERPL-SCNC: 25 MMOL/L (ref 20–28)
CREAT SERPL-MCNC: 1.34 MG/DL (ref 0.8–1.3)
DIFFERENTIAL METHOD BLD: NORMAL
EOSINOPHIL # BLD: 0.1 K/UL (ref 0–0.8)
EOSINOPHIL NFR BLD: 2 % (ref 0.5–7.8)
ERYTHROCYTE [DISTWIDTH] IN BLOOD BY AUTOMATED COUNT: 13.5 % (ref 11.9–14.6)
GLOBULIN SER CALC-MCNC: 3 G/DL (ref 2.3–3.5)
GLUCOSE SERPL-MCNC: 110 MG/DL (ref 70–99)
HCT VFR BLD AUTO: 45.9 % (ref 41.1–50.3)
HGB BLD-MCNC: 14.9 G/DL (ref 13.6–17.2)
IMM GRANULOCYTES # BLD AUTO: 0 K/UL (ref 0–0.5)
IMM GRANULOCYTES NFR BLD AUTO: 0 % (ref 0–5)
LYMPHOCYTES # BLD: 0.9 K/UL (ref 0.5–4.6)
LYMPHOCYTES NFR BLD: 17 % (ref 13–44)
MCH RBC QN AUTO: 30.2 PG (ref 26.1–32.9)
MCHC RBC AUTO-ENTMCNC: 32.5 G/DL (ref 31.4–35)
MCV RBC AUTO: 92.9 FL (ref 82–102)
MONOCYTES # BLD: 0.4 K/UL (ref 0.1–1.3)
MONOCYTES NFR BLD: 7 % (ref 4–12)
NEUTS SEG # BLD: 3.8 K/UL (ref 1.7–8.2)
NEUTS SEG NFR BLD: 74 % (ref 43–78)
NRBC # BLD: 0 K/UL (ref 0–0.2)
PLATELET # BLD AUTO: 253 K/UL (ref 150–450)
PMV BLD AUTO: 9.9 FL (ref 9.4–12.3)
POTASSIUM SERPL-SCNC: 4.2 MMOL/L (ref 3.5–5.1)
PROT SERPL-MCNC: 7.3 G/DL (ref 6.3–8.2)
RBC # BLD AUTO: 4.94 M/UL (ref 4.23–5.6)
SODIUM SERPL-SCNC: 138 MMOL/L (ref 136–145)
WBC # BLD AUTO: 5.2 K/UL (ref 4.3–11.1)

## 2024-06-03 PROCEDURE — 1123F ACP DISCUSS/DSCN MKR DOCD: CPT | Performed by: FAMILY MEDICINE

## 2024-06-03 PROCEDURE — G8427 DOCREV CUR MEDS BY ELIG CLIN: HCPCS | Performed by: FAMILY MEDICINE

## 2024-06-03 PROCEDURE — 99214 OFFICE O/P EST MOD 30 MIN: CPT | Performed by: FAMILY MEDICINE

## 2024-06-03 PROCEDURE — G8419 CALC BMI OUT NRM PARAM NOF/U: HCPCS | Performed by: FAMILY MEDICINE

## 2024-06-03 PROCEDURE — 81003 URINALYSIS AUTO W/O SCOPE: CPT | Performed by: FAMILY MEDICINE

## 2024-06-03 PROCEDURE — 1036F TOBACCO NON-USER: CPT | Performed by: FAMILY MEDICINE

## 2024-06-03 RX ORDER — TAMSULOSIN HYDROCHLORIDE 0.4 MG/1
0.4 CAPSULE ORAL DAILY
Qty: 90 CAPSULE | Refills: 3 | Status: SHIPPED | OUTPATIENT
Start: 2024-06-03

## 2024-06-03 SDOH — ECONOMIC STABILITY: FOOD INSECURITY: WITHIN THE PAST 12 MONTHS, THE FOOD YOU BOUGHT JUST DIDN'T LAST AND YOU DIDN'T HAVE MONEY TO GET MORE.: NEVER TRUE

## 2024-06-03 SDOH — ECONOMIC STABILITY: HOUSING INSECURITY
IN THE LAST 12 MONTHS, WAS THERE A TIME WHEN YOU DID NOT HAVE A STEADY PLACE TO SLEEP OR SLEPT IN A SHELTER (INCLUDING NOW)?: NO

## 2024-06-03 SDOH — ECONOMIC STABILITY: FOOD INSECURITY: WITHIN THE PAST 12 MONTHS, YOU WORRIED THAT YOUR FOOD WOULD RUN OUT BEFORE YOU GOT MONEY TO BUY MORE.: NEVER TRUE

## 2024-06-03 SDOH — ECONOMIC STABILITY: INCOME INSECURITY: HOW HARD IS IT FOR YOU TO PAY FOR THE VERY BASICS LIKE FOOD, HOUSING, MEDICAL CARE, AND HEATING?: NOT VERY HARD

## 2024-06-03 SDOH — ECONOMIC STABILITY - INCOME SECURITY: PROBLEM RELATED TO HOUSING AND ECONOMIC CIRCUMSTANCES, UNSPECIFIED: Z59.9

## 2024-06-03 ASSESSMENT — PATIENT HEALTH QUESTIONNAIRE - PHQ9
SUM OF ALL RESPONSES TO PHQ QUESTIONS 1-9: 0
SUM OF ALL RESPONSES TO PHQ9 QUESTIONS 1 & 2: 0
2. FEELING DOWN, DEPRESSED OR HOPELESS: NOT AT ALL
SUM OF ALL RESPONSES TO PHQ QUESTIONS 1-9: 0
SUM OF ALL RESPONSES TO PHQ QUESTIONS 1-9: 0
1. LITTLE INTEREST OR PLEASURE IN DOING THINGS: NOT AT ALL
SUM OF ALL RESPONSES TO PHQ QUESTIONS 1-9: 0

## 2024-06-03 NOTE — PATIENT INSTRUCTIONS
You likely have plantar fasciitis, you can buy some inserts in your shoes for this, Dr. Aguilera,   Try the exercises listed above,  If the exercises/change of shoes, inserts do not help, would see a podiatrist.  You do not need a referral, you can find one close to your home that you would like.        You had a renal mass diagnosed 2 years prior as well as a large stone in your bladder.  With urinary problems now with frequent urination, please give us a urine sample today as well as recheck kidney function,  We can try flomax for the frequency as well.  If any blood is found in the urine or any further issues, we really should repeat imaging of your bladder and kidneys and refer you back to urology.

## 2024-06-03 NOTE — PROGRESS NOTES
Minh Farley (: 1940) is a 83 y.o. male, established patient, here for evaluation of the following chief complaint(s):  Follow-up Chronic Condition and Foot Pain (Left/)       ASSESSMENT/PLAN:  1. Benign prostatic hyperplasia with urinary frequency  -     Culture, Urine; Future  -     AMB POC URINALYSIS DIP STICK AUTO W/O MICRO  -     CBC with Auto Differential; Future  -     Comprehensive Metabolic Panel; Future  2. Callous ulcer, limited to breakdown of skin (HCC)  -     Culture, Urine; Future  -     AMB POC URINALYSIS DIP STICK AUTO W/O MICRO  -     CBC with Auto Differential; Future  -     Comprehensive Metabolic Panel; Future  3. Renal cell cancer, right (HCC)  -     Culture, Urine; Future  -     AMB POC URINALYSIS DIP STICK AUTO W/O MICRO  -     CBC with Auto Differential; Future  -     Comprehensive Metabolic Panel; Future  4. Malignant neoplasm of right kidney, except renal pelvis (HCC)  5. Financial difficulties    Patient Instructions   You likely have plantar fasciitis, you can buy some inserts in your shoes for this, Dr. Aguilera,   Try the exercises listed above,  If the exercises/change of shoes, inserts do not help, would see a podiatrist.  You do not need a referral, you can find one close to your home that you would like.        You had a renal mass diagnosed 2 years prior as well as a large stone in your bladder.  With urinary problems now with frequent urination, please give us a urine sample today as well as recheck kidney function,  We can try flomax for the frequency as well.  If any blood is found in the urine or any further issues, we really should repeat imaging of your bladder and kidneys and refer you back to urology.            SUBJECTIVE/OBJECTIVE:  Foot Pain       Worsening foot pain on bottom of feet    Frequency of urination,  CT scan in  showed large urinary/bladder stone as well as renal mass, patient has declined all workup and urology followup.  Usually declines

## 2024-06-04 ENCOUNTER — NURSE ONLY (OUTPATIENT)
Dept: FAMILY MEDICINE CLINIC | Facility: CLINIC | Age: 84
End: 2024-06-04

## 2024-06-04 ENCOUNTER — TELEPHONE (OUTPATIENT)
Dept: FAMILY MEDICINE CLINIC | Facility: CLINIC | Age: 84
End: 2024-06-04

## 2024-06-04 DIAGNOSIS — R35.0 BENIGN PROSTATIC HYPERPLASIA WITH URINARY FREQUENCY: ICD-10-CM

## 2024-06-04 DIAGNOSIS — N40.1 BENIGN PROSTATIC HYPERPLASIA WITH URINARY FREQUENCY: ICD-10-CM

## 2024-06-04 DIAGNOSIS — L98.491 CALLOUS ULCER, LIMITED TO BREAKDOWN OF SKIN (HCC): ICD-10-CM

## 2024-06-04 DIAGNOSIS — C64.1 RENAL CELL CANCER, RIGHT (HCC): ICD-10-CM

## 2024-06-04 LAB
BILIRUBIN, URINE, POC: NEGATIVE
BLOOD URINE, POC: ABNORMAL
GLUCOSE URINE, POC: NEGATIVE
KETONES, URINE, POC: NEGATIVE
LEUKOCYTE ESTERASE, URINE, POC: ABNORMAL
NITRITE, URINE, POC: NEGATIVE
PH, URINE, POC: 5.5 (ref 4.6–8)
PROTEIN,URINE, POC: ABNORMAL
SPECIFIC GRAVITY, URINE, POC: 1.02 (ref 1–1.03)
URINALYSIS CLARITY, POC: ABNORMAL
URINALYSIS COLOR, POC: ABNORMAL
UROBILINOGEN, POC: ABNORMAL

## 2024-06-06 LAB
BACTERIA SPEC CULT: NORMAL
BACTERIA SPEC CULT: NORMAL
SERVICE CMNT-IMP: NORMAL

## 2024-06-14 ENCOUNTER — TELEPHONE (OUTPATIENT)
Dept: FAMILY MEDICINE CLINIC | Facility: CLINIC | Age: 84
End: 2024-06-14

## 2024-06-14 DIAGNOSIS — C64.1 RENAL CELL CANCER, RIGHT (HCC): ICD-10-CM

## 2024-06-14 DIAGNOSIS — C64.1 MALIGNANT NEOPLASM OF RIGHT KIDNEY, EXCEPT RENAL PELVIS (HCC): ICD-10-CM

## 2024-06-14 DIAGNOSIS — N40.1 BENIGN PROSTATIC HYPERPLASIA WITH URINARY FREQUENCY: Primary | ICD-10-CM

## 2024-06-14 DIAGNOSIS — R35.0 BENIGN PROSTATIC HYPERPLASIA WITH URINARY FREQUENCY: Primary | ICD-10-CM

## 2024-06-14 NOTE — TELEPHONE ENCOUNTER
Vm left for th patient stating that the referral was sent and he should be hearing from them within 5-10 business days.

## 2024-06-14 NOTE — TELEPHONE ENCOUNTER
If he is experiencing severe fatigue or insomnia, can stop it.  We may need to refer him back to urology, let him know.

## 2024-06-14 NOTE — TELEPHONE ENCOUNTER
Patient came into the office with complaints of the following symptoms from taking the following medication tamsulosin (FLOMAX) 0.4 MG capsule.    Per patient request a call back to discuss    Per the patient from 9:00 AM to 11:00 AM his appetite increase, from 2:00 PM to 3:00 PM he experience fatigue and Insomnia since he started taking tamsulosin (FLOMAX) 0.4 MG capsule on 06/05/2024    Provider Information:    Patient can be reached at [phone]    Pt last office visit: 06/03/20247  Pt follow up visit: 12/12/2024    tamsulosin (FLOMAX) 0.4 MG capsule [[id number]]    Order Details  Dose: 0.4 mg Route: Oral Frequency: DAILY   Dispense Quantity: 90 capsule Refills: 3          Sig: Take 1 capsule by mouth daily         Start Date: 06/03/24 End Date: --   Written Date: 06/03/24 Expiration Date: 06/03/25   Providers    Authorizing Provider: Mary Reis MD  NPI: [id number]   Ordering User: Mary Reis MD

## 2024-06-19 ENCOUNTER — TELEPHONE (OUTPATIENT)
Dept: FAMILY MEDICINE CLINIC | Facility: CLINIC | Age: 84
End: 2024-06-19

## 2024-06-19 NOTE — TELEPHONE ENCOUNTER
Patient wrote a note to the provider and the MA stating that they did not discuss a referral to another urology.   Patient does not want a referral to another urologist.      He has been having a lot of trouble with the Flomax 0.4 mg and advised the provider regarding his problems.  He was advised to stop the Flomax 0.4 mg.  He states in the letter that he has had 7 days of problems, but this weekend of 6/15/24 and 6/16/24, he returned to normal feeling.     Patient does not want to take Flomax anymore per telephone conversation.

## 2024-06-20 ENCOUNTER — TELEPHONE (OUTPATIENT)
Dept: UROLOGY | Age: 84
End: 2024-06-20

## 2024-06-20 NOTE — TELEPHONE ENCOUNTER
Pt is established with [facility] til 7/18/25. Closing referral pt does not need a referral til then. Let vm for pt to [facility] with Alec before 7/18/25

## 2024-12-12 ENCOUNTER — OFFICE VISIT (OUTPATIENT)
Dept: FAMILY MEDICINE CLINIC | Facility: CLINIC | Age: 84
End: 2024-12-12

## 2024-12-12 VITALS
SYSTOLIC BLOOD PRESSURE: 112 MMHG | HEIGHT: 70 IN | HEART RATE: 65 BPM | DIASTOLIC BLOOD PRESSURE: 78 MMHG | WEIGHT: 174.8 LBS | BODY MASS INDEX: 25.03 KG/M2 | OXYGEN SATURATION: 97 %

## 2024-12-12 DIAGNOSIS — Z59.9 FINANCIAL DIFFICULTIES: ICD-10-CM

## 2024-12-12 DIAGNOSIS — Z00.00 MEDICARE ANNUAL WELLNESS VISIT, SUBSEQUENT: Primary | ICD-10-CM

## 2024-12-12 DIAGNOSIS — N40.1 BENIGN PROSTATIC HYPERPLASIA WITH URINARY FREQUENCY: ICD-10-CM

## 2024-12-12 DIAGNOSIS — R35.0 BENIGN PROSTATIC HYPERPLASIA WITH URINARY FREQUENCY: ICD-10-CM

## 2024-12-12 DIAGNOSIS — C64.1 MALIGNANT NEOPLASM OF RIGHT KIDNEY, EXCEPT RENAL PELVIS (HCC): ICD-10-CM

## 2024-12-12 RX ORDER — TAMSULOSIN HYDROCHLORIDE 0.4 MG/1
0.4 CAPSULE ORAL DAILY
Qty: 90 CAPSULE | Refills: 3 | Status: SHIPPED | OUTPATIENT
Start: 2024-12-12 | End: 2024-12-12

## 2024-12-12 SDOH — ECONOMIC STABILITY - INCOME SECURITY: PROBLEM RELATED TO HOUSING AND ECONOMIC CIRCUMSTANCES, UNSPECIFIED: Z59.9

## 2024-12-12 ASSESSMENT — PATIENT HEALTH QUESTIONNAIRE - PHQ9
1. LITTLE INTEREST OR PLEASURE IN DOING THINGS: NOT AT ALL
SUM OF ALL RESPONSES TO PHQ9 QUESTIONS 1 & 2: 0
2. FEELING DOWN, DEPRESSED OR HOPELESS: NOT AT ALL
SUM OF ALL RESPONSES TO PHQ QUESTIONS 1-9: 0

## 2024-12-12 NOTE — PATIENT INSTRUCTIONS

## 2024-12-12 NOTE — PROGRESS NOTES
Minh Farley (: 1940) is a 83 y.o. male, established patient, here for evaluation of the following chief complaint(s):  Medicare AWV and Follow-up Chronic Condition (Trying to drink coke for his prostate, has been doing well with this.  )       ASSESSMENT/PLAN:  1. Medicare annual wellness visit, subsequent  2. Financial difficulties  3. Malignant neoplasm of right kidney, except renal pelvis (HCC)  4. Benign prostatic hyperplasia with urinary frequency      Return in about 1 year (around 2025) for medicare wellness.    Medicare wellness visit completed  Declines any medications for his prostate issues, declines referral to urology for his renal mass, declines surgical referral for his large ventral/umbilical hernia,    Reviewed labs done within the past year, declines labs today  See dentist every 6 months, declines  See eye physician or get eyes checked every 1-2 years, declines  Immunizations reviewed and discussed with patient, declines,    SUBJECTIVE/OBJECTIVE:  HPI  Patient is a 83-year-old male with history of BPH, renal mass, has been lost to follow-up from urology, declines to follow-up, has hernia after gallbladder surgery declines any referral for this.  Patient states he is overall doing well, is treating his BPH with drinking Coke 0, which he states helps his symptoms, declines Flomax or any other medications.    Physical Exam  Constitutional:       General: He is not in acute distress.     Appearance: He is not ill-appearing, toxic-appearing or diaphoretic.   HENT:      Head: Normocephalic and atraumatic.      Right Ear: External ear normal. There is no impacted cerumen.      Left Ear: External ear normal. There is no impacted cerumen.      Nose: Nose normal. No congestion or rhinorrhea.      Mouth/Throat:      Mouth: Mucous membranes are moist.      Pharynx: No oropharyngeal exudate or posterior oropharyngeal erythema.   Eyes:      General:         Right eye: No discharge.

## (undated) DEVICE — PACK SURGICAL PROCEDURE KIT CYSTOSCOPY TOTE

## (undated) DEVICE — SOLUTION IRRIG 3000ML H2O STRL BAG

## (undated) DEVICE — GUIDEWIRE UROLOGICAL STR 3 CM 0.038 INX150 CM DUAL-FLEX

## (undated) DEVICE — GOWN,REINFORCED,POLY,AURORA,XXLARGE,STR: Brand: MEDLINE

## (undated) DEVICE — GLOVE ORANGE PI 8 1/2   MSG9085